# Patient Record
Sex: MALE | Race: ASIAN
[De-identification: names, ages, dates, MRNs, and addresses within clinical notes are randomized per-mention and may not be internally consistent; named-entity substitution may affect disease eponyms.]

---

## 2019-12-04 ENCOUNTER — HOSPITAL ENCOUNTER (EMERGENCY)
Dept: HOSPITAL 27 - EMS | Age: 39
LOS: 1 days | Discharge: HOME | End: 2019-12-05
Payer: COMMERCIAL

## 2019-12-04 VITALS — HEIGHT: 66 IN | BODY MASS INDEX: 22.82 KG/M2 | WEIGHT: 141.98 LBS

## 2019-12-04 DIAGNOSIS — G40.909: Primary | ICD-10-CM

## 2019-12-04 LAB
ALBUMIN SERPL-MCNC: 4.1 G/DL (ref 3.4–5)
ALP SERPL-CCNC: 148 U/L (ref 46–116)
ALT SERPL-CCNC: 84 U/L (ref 12–78)
ANION GAP SERPL CALCULATED.3IONS-SCNC: 8 MMOL/L (ref 8–16)
AST SERPL-CCNC: 37 U/L (ref 15–37)
BASOPHILS # BLD AUTO: 0.1 K/UL (ref 0–0.2)
BASOPHILS NFR BLD AUTO: 0.6 % (ref 0–2)
BILIRUB SERPL-MCNC: 0.4 MG/DL (ref 0.1–1)
BUN SERPL-MCNC: 16 MG/DL (ref 7–18)
CALCIUM SERPL-MCNC: 9.1 MG/DL (ref 8.8–10.5)
CHLORIDE SERPL-SCNC: 99 MMOL/L (ref 98–107)
CO2 SERPL-SCNC: 29 MMOL/L (ref 22–29)
CREAT SERPL-MCNC: 1.32 MG/DL (ref 0.6–1.3)
EOSINOPHIL # BLD AUTO: 0.1 K/UL (ref 0–0.7)
EOSINOPHIL NFR BLD AUTO: 1 % (ref 1–6)
ERYTHROCYTE [DISTWIDTH] IN BLOOD BY AUTOMATED COUNT: 12 % (ref 11.5–14.5)
GFR SERPL CREATININE-BSD FRML MDRD: > 60 ML/MIN (ref 60–?)
GLUCOSE SERPL-MCNC: 146 MG/DL (ref 70–110)
HCT VFR BLD AUTO: 43.2 % (ref 41–53)
HGB BLD-MCNC: 14.7 G/DL (ref 13.5–17.5)
LYMPHOCYTES # BLD AUTO: 4.1 K/UL (ref 1–4.8)
LYMPHOCYTES NFR BLD AUTO: 38.1 % (ref 22–44)
MCH RBC QN AUTO: 29.7 PG (ref 26–34)
MCHC RBC AUTO-ENTMCNC: 34.1 G/DL (ref 31–37)
MCV RBC AUTO: 87 FL (ref 80–100)
MONOCYTES # BLD AUTO: 0.9 K/UL (ref 0.1–1)
MONOCYTES NFR BLD AUTO: 7.9 % (ref 2–9)
NEUTROPHILS # BLD AUTO: 5.6 K/UL (ref 1.8–7.7)
NEUTROPHILS NFR BLD AUTO: 52.4 % (ref 40–70)
PHENYTOIN SERPL-MCNC: < 0.5 MCG/ML (ref 10–20)
PLATELET # BLD AUTO: 308 K/UL (ref 150–450)
POTASSIUM SERPL-SCNC: 3.3 MMOL/L (ref 3.5–5.1)
PROT SERPL-MCNC: 8.4 G/DL (ref 6.4–8.2)
RBC # BLD AUTO: 4.96 MIL/UL (ref 4.5–5.9)
SODIUM SERPL-SCNC: 136 MMOL/L (ref 136–145)
VALPROATE SERPL-MCNC: < 3 MCG/ML (ref 50–100)

## 2019-12-04 PROCEDURE — 80307 DRUG TEST PRSMV CHEM ANLYZR: CPT

## 2019-12-04 PROCEDURE — 80185 ASSAY OF PHENYTOIN TOTAL: CPT

## 2019-12-04 PROCEDURE — 85730 THROMBOPLASTIN TIME PARTIAL: CPT

## 2019-12-04 PROCEDURE — 36415 COLL VENOUS BLD VENIPUNCTURE: CPT

## 2019-12-04 PROCEDURE — 85025 COMPLETE CBC W/AUTO DIFF WBC: CPT

## 2019-12-04 PROCEDURE — 81003 URINALYSIS AUTO W/O SCOPE: CPT

## 2019-12-04 PROCEDURE — 80164 ASSAY DIPROPYLACETIC ACD TOT: CPT

## 2019-12-04 PROCEDURE — 99285 EMERGENCY DEPT VISIT HI MDM: CPT

## 2019-12-04 PROCEDURE — 70450 CT HEAD/BRAIN W/O DYE: CPT

## 2019-12-04 PROCEDURE — 96365 THER/PROPH/DIAG IV INF INIT: CPT

## 2019-12-04 PROCEDURE — 80053 COMPREHEN METABOLIC PANEL: CPT

## 2019-12-05 VITALS — DIASTOLIC BLOOD PRESSURE: 81 MMHG | SYSTOLIC BLOOD PRESSURE: 143 MMHG

## 2019-12-05 LAB
AMPHETAMINES UR QL SCN: NEGATIVE
APPEARANCE UR: CLEAR
BARBITURATES UR QL SCN: NEGATIVE
BENZODIAZ UR QL SCN: NEGATIVE
BILIRUB UR QL STRIP: NEGATIVE
BZE UR QL SCN: NEGATIVE
CANNABINOIDS UR QL SCN: NEGATIVE
GLUCOSE UR STRIP-MCNC: NEGATIVE MG/DL
KETONES UR STRIP-MCNC: NEGATIVE MG/DL
LEUKOCYTE ESTERASE UR QL STRIP: NEGATIVE
METHADONE UR QL SCN: NEGATIVE
NITRITE UR QL STRIP: NEGATIVE
OPIATES UR QL SCN: NEGATIVE
PCP UR QL SCN: NEGATIVE
PH UR STRIP: 6.5 [PH] (ref 5–8)
PROT UR QL STRIP: NEGATIVE
RBC UR QL: NEGATIVE
SP GR UR STRIP: 1.01 (ref 1–1.02)
UROBILINOGEN UR-MCNC: 0.2 MG/DL (ref ?–1)

## 2020-11-03 ENCOUNTER — OFFICE VISIT (OUTPATIENT)
Dept: FAMILY MEDICINE | Facility: CLINIC | Age: 40
End: 2020-11-03
Payer: COMMERCIAL

## 2020-11-03 VITALS
SYSTOLIC BLOOD PRESSURE: 149 MMHG | WEIGHT: 143.6 LBS | DIASTOLIC BLOOD PRESSURE: 93 MMHG | HEART RATE: 70 BPM | BODY MASS INDEX: 26.43 KG/M2 | OXYGEN SATURATION: 100 % | RESPIRATION RATE: 18 BRPM | HEIGHT: 62 IN

## 2020-11-03 DIAGNOSIS — G40.909 SEIZURE DISORDER (H): ICD-10-CM

## 2020-11-03 DIAGNOSIS — R03.0 ELEVATED BLOOD PRESSURE READING WITHOUT DIAGNOSIS OF HYPERTENSION: ICD-10-CM

## 2020-11-03 DIAGNOSIS — Z00.00 ROUTINE GENERAL MEDICAL EXAMINATION AT A HEALTH CARE FACILITY: ICD-10-CM

## 2020-11-03 DIAGNOSIS — Z23 NEED FOR PROPHYLACTIC VACCINATION AND INOCULATION AGAINST INFLUENZA: Primary | ICD-10-CM

## 2020-11-03 PROCEDURE — 99385 PREV VISIT NEW AGE 18-39: CPT | Mod: 25 | Performed by: STUDENT IN AN ORGANIZED HEALTH CARE EDUCATION/TRAINING PROGRAM

## 2020-11-03 PROCEDURE — 90686 IIV4 VACC NO PRSV 0.5 ML IM: CPT | Performed by: STUDENT IN AN ORGANIZED HEALTH CARE EDUCATION/TRAINING PROGRAM

## 2020-11-03 PROCEDURE — 90471 IMMUNIZATION ADMIN: CPT | Performed by: STUDENT IN AN ORGANIZED HEALTH CARE EDUCATION/TRAINING PROGRAM

## 2020-11-03 RX ORDER — LEVETIRACETAM 750 MG/1
750 TABLET ORAL 2 TIMES DAILY
COMMUNITY
End: 2022-11-08

## 2020-11-03 SDOH — HEALTH STABILITY: MENTAL HEALTH: HOW OFTEN DO YOU HAVE 6 OR MORE DRINKS ON ONE OCCASION?: NOT ASKED

## 2020-11-03 SDOH — HEALTH STABILITY: MENTAL HEALTH: HOW MANY STANDARD DRINKS CONTAINING ALCOHOL DO YOU HAVE ON A TYPICAL DAY?: NOT ASKED

## 2020-11-03 SDOH — HEALTH STABILITY: MENTAL HEALTH: HOW OFTEN DO YOU HAVE A DRINK CONTAINING ALCOHOL?: NOT ASKED

## 2020-11-03 ASSESSMENT — MIFFLIN-ST. JEOR: SCORE: 1443.24

## 2020-11-03 NOTE — PROGRESS NOTES
Male Physical Note    Concerns today: needs request for medical opinion form completed for [facility]    ALEK Draper  was used for  this visit.     ROS:                      CONSTITUTIONAL: no fatigue, no unexpected change in weight  SKIN: no worrisome rashes, no worrisome moles, no worrisome lesions  EYES: no acute vision problems or changes  ENT: no ear problems, no mouth problems, no throat problems  RESP: no significant cough, no shortness of breath  CV: no chest pain, no palpitations, no new or worsening peripheral edema  GI: no nausea, no vomiting, no constipation, no diarrhea    No past medical history on file. Denies any past medical history.     Family History   Problem Relation Age of Onset     No Known Problems Mother      No Known Problems Father      No Known Problems Maternal Grandmother      No Known Problems Maternal Grandfather      No Known Problems Paternal Grandmother      No Known Problems Paternal Grandfather      No Known Problems Brother      No Known Problems Sister      No Known Problems Son      No Known Problems Daughter      No Known Problems Maternal Half-Brother      No Known Problems Maternal Half-Sister      No Known Problems Paternal Half-Brother      No Known Problems Paternal Half-Sister      No Known Problems Niece      No Known Problems Nephew      No Known Problems Cousin      No Known Problems Other      Cancer No family hx of      Diabetes No family hx of      Coronary Artery Disease No family hx of      Heart Disease No family hx of      Hypertension No family hx of      Hyperlipidemia No family hx of      Kidney Disease No family hx of      Cerebrovascular Disease No family hx of      Obesity No family hx of      Thrombosis No family hx of      Asthma No family hx of      Arthritis No family hx of      Thyroid Disease No family hx of      Depression No family hx of      Mental Illness No family hx of      Substance Abuse No family hx of      Cystic Fibrosis No  "family hx of      Early Death No family hx of      Coronary Artery Disease Early Onset No family hx of      Heart Failure No family hx of      Bleeding Diathesis No family hx of      Dementia No family hx of      Breast Cancer No family hx of      Ovarian Cancer No family hx of      Uterine Cancer No family hx of      Prostate Cancer No family hx of      Colorectal Cancer No family hx of      Pancreatic Cancer No family hx of      Lung Cancer No family hx of      Melanoma No family hx of      Autoimmune Disease No family hx of      Unknown/Adopted No family hx of      Genetic Disorder No family hx of     Reviewed no other significant FH           Family History and past Medical History reviewed and unchanged/updated.    Social History     Tobacco Use     Smoking status: Light Tobacco Smoker     Types: Cigarettes     Smokeless tobacco: Current User   Substance Use Topics     Alcohol use: Not Currently       Children ? Yes, 4 children    Has anyone hurt you physically, for example by pushing, hitting, slapping or kicking you or forcing you to have sex? Denies  Do you feel threatened or controlled by a partner, ex-partner or anyone in your life? Denies    RISK BEHAVIORS AND HEALTHY HABITS:  Tobacco Use/Smokin-2 cigarettes per week  Illicit Drug Use: None  ETOH: None  Sexually Active: Yes  Diet (5-7 servings of fruits/veg daily): No   Exercise (30 min accumulated most days):No  Dental Care: No   Calcium 1500 mg/d:  Yes  Seat Belt Use: Yes     HIV screening:  Will attempt to obtain outside records      There is no immunization history on file for this patient. Will attempt to obtain outside records. Patient cannot recall name of previous clinic in [place]. He will attempt to obtain this for his follow-up visit in 1 month.    EXAMINATION:  BP (!) 149/93 (BP Location: Left arm, Patient Position: Left side, Cuff Size: Adult Regular)   Pulse 70   Resp 18   Ht 1.571 m (5' 1.85\")   Wt 65.1 kg (143 lb 9.6 oz)  "  SpO2 100%   BMI 26.39 kg/m    GENERAL: healthy, alert and no distress  EYES: Eyes grossly normal to inspection, extraocular movements - intact, and PERRL  HENT: ear canals- normal; TMs- normal; Nose- normal; Mouth- no ulcers, no lesions  NECK: no tenderness, no adenopathy, no asymmetry, no masses, no stiffness; thyroid- normal to palpation  RESP: lungs clear to auscultation - no rales, no rhonchi, no wheezes  CV: regular rates and rhythm, normal S1 S2, no S3 or S4 and no murmur, no click or rub -  ABDOMEN: soft, no tenderness, no  hepatosplenomegaly, no masses, normal bowel sounds  MS: extremities- no gross deformities noted, no edema  SKIN: no suspicious lesions, no rashes  NEURO: strength and tone- normal, sensory exam- grossly normal, mentation- intact, speech- normal, reflexes- symmetric  BACK: no CVA tenderness, no paralumbar tenderness  PSYCH: Alert and oriented times 3; speech- coherent , normal rate and volume; able to articulate logical thoughts, able to abstract reason, no tangential thoughts, no hallucinations or delusions, affect- normal  LYMPHATICS: ant. cervical- normal, post. cervical- normal, axillary- normal, supraclavicular- normal, inguinal- normal    ASSESSMENT/PLAN:  Ler was seen today for physical and imm/inj.    Diagnoses and all orders for this visit:    Need for prophylactic vaccination and inoculation against influenza  -     INFLUENZA VACCINE IM > 6 MONTHS VALENT IIV4 [31528]    Routine general medical examination at a health care facility    Seizure disorder (H)  Completed medical opinion form and will have it scanned into chart. No driving or operating heavy equipment.    Elevated blood pressure reading without diagnosis of hypertension  Discussed with patient and recommended repeat BP check within 1 month.    I precepted with Dr. Rice.  Yvrose Pradhan MD PGY3

## 2020-11-03 NOTE — NURSING NOTE
Due to patient being non-English speaking/uses sign language, an  was used for this visit. Only for face-to-face interpretation by an external agency, date and length of interpretation can be found on the scanned worksheet.     name: Kristie Justice   Language: Bonny  Agency: [employer]  Phone number: [phone]      KELLEY Matthews  8:13 AM  11/3/2020

## 2020-11-03 NOTE — PROGRESS NOTES
Preceptor Attestation:    Patient seen and evaluated in person. I discussed the patient with the resident. I have verified the content of the note, which accurately reflects my assessment of the patient and the plan of care.   Supervising Physician:  Wilson Rice MD.

## 2020-12-07 ENCOUNTER — OFFICE VISIT (OUTPATIENT)
Dept: FAMILY MEDICINE | Facility: CLINIC | Age: 40
End: 2020-12-07
Payer: COMMERCIAL

## 2020-12-07 VITALS
WEIGHT: 145 LBS | DIASTOLIC BLOOD PRESSURE: 92 MMHG | RESPIRATION RATE: 24 BRPM | HEIGHT: 61 IN | TEMPERATURE: 98.3 F | SYSTOLIC BLOOD PRESSURE: 137 MMHG | OXYGEN SATURATION: 99 % | HEART RATE: 91 BPM | BODY MASS INDEX: 27.38 KG/M2

## 2020-12-07 DIAGNOSIS — I10 ESSENTIAL HYPERTENSION: Primary | ICD-10-CM

## 2020-12-07 LAB
% GRANULOCYTES: 62.8 %G (ref 40–75)
BUN SERPL-MCNC: 11.3 MG/DL (ref 7–21)
CALCIUM SERPL-MCNC: 9.9 MG/DL (ref 8.5–10.1)
CHLORIDE SERPLBLD-SCNC: 98.4 MMOL/L (ref 98–110)
CHOLEST SERPL-MCNC: 226.8 MG/DL (ref 0–200)
CHOLEST/HDLC SERPL: 8.4 {RATIO} (ref 0–5)
CO2 SERPL-SCNC: 31.4 MMOL/L (ref 20–32)
CREAT SERPL-MCNC: 1.1 MG/DL (ref 0.7–1.3)
GFR SERPL CREATININE-BSD FRML MDRD: 76.8 ML/MIN/1.7 M2
GLUCOSE SERPL-MCNC: 114.8 MG'DL (ref 70–99)
GRANULOCYTES #: 5 K/UL (ref 1.6–8.3)
HBA1C MFR BLD: 4.8 % (ref 4.1–5.7)
HCT VFR BLD AUTO: 47.9 % (ref 40–53)
HDLC SERPL-MCNC: 26.9 MG/DL
HEMOGLOBIN: 15 G/DL (ref 13.3–17.7)
LDLC SERPL CALC-MCNC: ABNORMAL MG/DL (ref 0–129)
LDLC SERPL DIRECT ASSAY-MCNC: 80 MG/DL
LYMPHOCYTES # BLD AUTO: 2.3 K/UL (ref 0.8–5.3)
LYMPHOCYTES NFR BLD AUTO: 29.4 %L (ref 20–48)
MCH RBC QN AUTO: 29.1 PG (ref 26.5–35)
MCHC RBC AUTO-ENTMCNC: 31.3 G/DL (ref 32–36)
MCV RBC AUTO: 93.1 FL (ref 78–100)
MID #: 0.6 K/UL (ref 0–2.2)
MID %: 7.8 %M (ref 0–20)
PLATELET # BLD AUTO: 335 K/UL (ref 150–450)
POTASSIUM SERPL-SCNC: 4.7 MMOL/L (ref 3.2–4.6)
RBC # BLD AUTO: 5.2 M/UL (ref 4.4–5.9)
SODIUM SERPL-SCNC: 134.9 MMOL/L (ref 132–142)
TRIGL SERPL-MCNC: >941 MG/DL (ref 0–150)
TSH SERPL DL<=0.05 MIU/L-ACNC: 1.65 UIU/ML (ref 0.3–5)
VLDL CHOLESTEROL: 236.1 MG/DL (ref 7–32)
WBC # BLD AUTO: 7.9 K/UL (ref 4–11)

## 2020-12-07 PROCEDURE — 85025 COMPLETE CBC W/AUTO DIFF WBC: CPT | Performed by: STUDENT IN AN ORGANIZED HEALTH CARE EDUCATION/TRAINING PROGRAM

## 2020-12-07 PROCEDURE — 83036 HEMOGLOBIN GLYCOSYLATED A1C: CPT | Performed by: STUDENT IN AN ORGANIZED HEALTH CARE EDUCATION/TRAINING PROGRAM

## 2020-12-07 PROCEDURE — 99214 OFFICE O/P EST MOD 30 MIN: CPT | Mod: GC | Performed by: STUDENT IN AN ORGANIZED HEALTH CARE EDUCATION/TRAINING PROGRAM

## 2020-12-07 PROCEDURE — 36415 COLL VENOUS BLD VENIPUNCTURE: CPT | Performed by: STUDENT IN AN ORGANIZED HEALTH CARE EDUCATION/TRAINING PROGRAM

## 2020-12-07 PROCEDURE — 80048 BASIC METABOLIC PNL TOTAL CA: CPT | Performed by: STUDENT IN AN ORGANIZED HEALTH CARE EDUCATION/TRAINING PROGRAM

## 2020-12-07 PROCEDURE — 80061 LIPID PANEL: CPT | Performed by: STUDENT IN AN ORGANIZED HEALTH CARE EDUCATION/TRAINING PROGRAM

## 2020-12-07 RX ORDER — AMLODIPINE BESYLATE 5 MG/1
5 TABLET ORAL DAILY
Qty: 90 TABLET | Refills: 0 | Status: SHIPPED | OUTPATIENT
Start: 2020-12-07 | End: 2021-01-05

## 2020-12-07 ASSESSMENT — MIFFLIN-ST. JEOR: SCORE: 1428.16

## 2020-12-07 NOTE — PROGRESS NOTES
"[facility]         SUBJECTIVE   Clara Escobar is a 40 year old male with a PMH of:  Patient Active Problem List   Diagnosis     Seizure disorder (H)     presenting to clinic today to follow-up regarding an elevated blood pressure without history of hypertension.  He was seen for a physical on 11/3/2020.  His blood pressure at that visit was noted to be 147/96 and 149/93 on repeat.  He recently moved to [place] from [place], and outside records are not available.  He denies any known history of hypertension and states that he has never been on any medications for his blood pressure.  He does note occasional headaches approximately once per week.  He denies any vision changes.  He states that he is currently not active, as he mostly stays home due to the COVID-19 pandemic.  He eats a traditional  diet including rice and meat.    PMH, Medications and Allergies were reviewed and updated as needed.    ROS:  General: No fevers, chills  Head: Intermittent headaches approximately once per week  Ears: No acute change in hearing.    CV: No chest pain or palpitations.  Resp: No shortness of breath.  No cough. No hemoptysis.  GI: No nausea, vomiting, constipation, diarrhea  : No urinary pains    Current Outpatient Medications   Medication Sig Dispense Refill     levETIRAcetam (KEPPRA) 750 MG tablet Take 750 mg by mouth 2 times daily            OBJECTIVE:   Vitals:   Vitals:    12/07/20 1359 12/07/20 1402   BP: (!) 149/99 (!) 137/92   BP Location: Right arm Right arm   Patient Position: Sitting Sitting   Cuff Size: Adult Regular Adult Regular   Pulse: 91    Resp: 24    Temp: 98.3  F (36.8  C)    TempSrc: Oral    SpO2: 99%    Weight: 65.8 kg (145 lb)    Height: 1.537 m (5' 0.5\")      BMI: Body mass index is 27.85 kg/m .    Gen:  Well nourished and in no acute distress  HEENT: Extraocular movement intact.  Neck: supple without lymphadenopathy  CV:  RRR  - no murmurs noted   Pulm:  CTAB, no wheezes or " crackles noted, good air entry   ABD: Soft, nontender, no masses, no rebound, BS intact throughout, no hepatosplenomegaly  Extrem: No cyanosis, edema or clubbing  Psych: Euthymic           ASSESSMENT and PLAN:   Clara was seen today for follow up.    Diagnoses and all orders for this visit:    Essential hypertension  -     Basic Metabolic Panel ([facility])  - Results < 1 hr  -     Urinalysis ([facility])  -     CBC with Diff Plt ([facility])  -     Lipid Panel ([facility]) - Results < 1 hr  -     TSH  Sensitive ([facility])  -     Hemoglobin A1c ([facility])  -     Blood Pressure Monitoring (BLOOD PRESSURE MONITOR/M CUFF) MISC; 1 each daily  -     amLODIPine (NORVASC) 5 MG tablet; Take 1 tablet (5 mg) by mouth daily    Since this is a new diagnosis of hypertension, will obtain lab work for further evaluation.  He does not feel like he could make significant lifestyle changes at this time and is interested in starting a medication.  Will start amlodipine 5 mg daily.  Also prescribed a blood pressure monitor.  We will have him return in 1 month for a blood pressure check.  Also asked him to bring his blood pressure monitor with him in case he has any questions on how to use it.    Return to clinic in 1 month for follow up of hypertension. Return sooner if develops new or worsening symptoms.    Patient Instructions   -Go to lab before leaving today.  -Start taking amlodipine 1 tablet daily.  -Also pickup blood pressure cuff from the pharmacy.  -Follow-up in 1 month to help make sure the medication is working.  -If you have any lightheadedness of issues with the medication, please call the clinic.    Options for treatment and/or follow-up care were reviewed with the patient was actively involved in the decision making process. Patient verbalized understanding and was in agreement with the plan.    The patient was seen by and discussed with Benjamin Davis MD.  Yvrose Pradhan MD PGY3

## 2020-12-07 NOTE — PATIENT INSTRUCTIONS
-Go to lab before leaving today.  -Start taking amlodipine 1 tablet daily.  -Also pickup blood pressure cuff from the pharmacy.  -Follow-up in 1 month to help make sure the medication is working.  -If you have any lightheadedness of issues with the medication, please call the clinic.

## 2020-12-07 NOTE — NURSING NOTE
Due to patient being non-English speaking/uses sign language, an  was used for this visit. Only for face-to-face interpretation by an external agency, date and length of interpretation can be found on the scanned worksheet.     name: Kristie Justice  Agency: [employer]  Language: Bonny   Telephone number: [phone]  Type of interpretation: Telemedicine, spoken

## 2020-12-07 NOTE — PROGRESS NOTES
Preceptor Attestation:    Patient seen and evaluated in person. I discussed the patient with the resident. I have verified the content of the note, which accurately reflects my assessment of the patient and the plan of care.   Supervising Physician:  Benjamin Davis MD.

## 2021-01-05 ENCOUNTER — OFFICE VISIT (OUTPATIENT)
Dept: FAMILY MEDICINE | Facility: CLINIC | Age: 41
End: 2021-01-05
Payer: COMMERCIAL

## 2021-01-05 VITALS
HEIGHT: 62 IN | WEIGHT: 143 LBS | RESPIRATION RATE: 20 BRPM | OXYGEN SATURATION: 100 % | DIASTOLIC BLOOD PRESSURE: 94 MMHG | TEMPERATURE: 97.3 F | HEART RATE: 80 BPM | BODY MASS INDEX: 26.31 KG/M2 | SYSTOLIC BLOOD PRESSURE: 137 MMHG

## 2021-01-05 DIAGNOSIS — E78.1 HYPERTRIGLYCERIDEMIA: ICD-10-CM

## 2021-01-05 DIAGNOSIS — I10 ESSENTIAL HYPERTENSION: Primary | ICD-10-CM

## 2021-01-05 LAB
BILIRUBIN UR: NEGATIVE MG/DL
BLOOD UR: NEGATIVE MG/DL
GLUCOSE URINE: NEGATIVE
KETONES UR QL: NEGATIVE MG/DL
LEUKOCYTE ESTERASE UR: NEGATIVE
NITRITE UR QL STRIP: NEGATIVE MG/DL
PH UR STRIP: 7 [PH] (ref 4.5–8)
PROTEIN UR: NEGATIVE MG/DL
SP GR UR STRIP: 1.02 (ref 1–1.03)
UROBILINOGEN UR STRIP-ACNC: NORMAL E.U./DL

## 2021-01-05 PROCEDURE — 81003 URINALYSIS AUTO W/O SCOPE: CPT | Performed by: STUDENT IN AN ORGANIZED HEALTH CARE EDUCATION/TRAINING PROGRAM

## 2021-01-05 PROCEDURE — 99214 OFFICE O/P EST MOD 30 MIN: CPT | Mod: GC | Performed by: STUDENT IN AN ORGANIZED HEALTH CARE EDUCATION/TRAINING PROGRAM

## 2021-01-05 RX ORDER — AMLODIPINE BESYLATE 10 MG/1
10 TABLET ORAL DAILY
Qty: 90 TABLET | Refills: 1 | Status: SHIPPED | OUTPATIENT
Start: 2021-01-05 | End: 2022-11-08

## 2021-01-05 RX ORDER — FENOFIBRATE 48 MG/1
48 TABLET, COATED ORAL DAILY
Qty: 90 TABLET | Refills: 0 | Status: SHIPPED | OUTPATIENT
Start: 2021-01-05 | End: 2022-11-08

## 2021-01-05 ASSESSMENT — MIFFLIN-ST. JEOR: SCORE: 1446.14

## 2021-01-05 NOTE — NURSING NOTE
Due to patient being non-English speaking/uses sign language, an  was used for this visit. Only for face-to-face interpretation by an external agency, date and length of interpretation can be found on the scanned worksheet.     name: Kristie Justice  Agency: [employer]  Language: Bonny   Telephone number: [phone]  Type of interpretation: Telephone, spoken

## 2021-01-05 NOTE — PATIENT INSTRUCTIONS
-Get your fasting lipid panel drawn at lab. You can schedule a lab only visit for this.  -Increase amlodipine to 10 mg daily.  -Start fenofibrate 48 mg daily.

## 2021-01-05 NOTE — PROGRESS NOTES
"Assessment & Plan     Essential hypertension  - Urinalysis ([facility])  - amLODIPine (NORVASC) 10 MG tablet  Dispense: 90 tablet; Refill: 1  - Blood Pressure Monitoring (BLOOD PRESSURE MONITOR/M CUFF) MISC  Dispense: 1 each; Refill: 0    Hypertriglyceridemia  - Lipid Panel ([facility]) - Results < 1 hr  - fenofibrate (TRICOR) 48 MG tablet  Dispense: 90 tablet; Refill: 0    Review of the result(s) of each unique test - lipid panel, BMP, CBC, TSH, HgbA1c. Discussed results with patient, possible complications of hyper triglyceridemia including pancreatitis, and discussed management options including dietary modification and medications.            40 minutes spent on the date of the encounter doing chart review, history and exam, documentation and further activities as noted above         BMI:   Estimated body mass index is 25.98 kg/m  as calculated from the following:    Height as of this encounter: 1.58 m (5' 2.21\").    Weight as of this encounter: 64.9 kg (143 lb).   Weight management plan: Discussed healthy diet and exercise guidelines    FUTURE LABS:       - Schedule a fasting blood draw in 2 days  FUTURE APPOINTMENTS:       - Follow-up visit in 6 weeks    Patient Instructions   -Get your fasting lipid panel drawn at lab. You can schedule a lab only visit for this.  -Increase amlodipine to 10 mg daily.  -Start fenofibrate 48 mg daily.    Return in about 6 weeks (around 2/16/2021).    Yvrose Pradhan MD PGY3  [facility]    Ila Escobar is a 40 year old male who presents to clinic today for the following health issues:    HPI   Clara Escobar is a 40 year old male with a history of   Patient Active Problem List   Diagnosis     Seizure disorder (H)     Essential hypertension     Following up regarding a new diagnosis of hypertension. Has been taking amlodipine 5 daily without any side effects.  She was not able to  the blood pressure cuff at the pharmacy.    No personal or family history " "of pancreatitis or hypertriglyceridemia as far as he is aware.    He did bring his immunization record with him today.    Review of Systems   Constitutional, HEENT, cardiovascular, pulmonary, gi and gu systems are negative, except as otherwise noted.      Objective    BP (!) 137/94   Pulse 80   Temp 97.3  F (36.3  C) (Oral)   Resp 20   Ht 1.58 m (5' 2.21\")   Wt 64.9 kg (143 lb)   SpO2 100%   BMI 25.98 kg/m    Body mass index is 25.98 kg/m .     Physical Exam   GENERAL: healthy, alert and no distress  NECK: no adenopathy, no asymmetry, masses, or scars and thyroid normal to palpation  RESP: lungs clear to auscultation - no rales, rhonchi or wheezes  CV: regular rate and rhythm, normal S1 S2, no S3 or S4, no murmur, click or rub, no peripheral edema and peripheral pulses strong  ABDOMEN: soft, nontender, no hepatosplenomegaly, no masses and bowel sounds normal  MS: no gross musculoskeletal defects noted, no edema    Office Visit on 12/07/2020   Component Date Value Ref Range Status     Urea Nitrogen 12/07/2020 11.3  7.0 - 21.0 mg/dL Final     Calcium 12/07/2020 9.9  8.5 - 10.1 mg/dL Final     Chloride 12/07/2020 98.4  98.0 - 110.0 mmol/L Final     Carbon Dioxide 12/07/2020 31.4  20.0 - 32.0 mmol/L Final     Creatinine 12/07/2020 1.1  0.7 - 1.3 mg/dL Final     Glucose 12/07/2020 114.8* 70.0 - 99.0 mg'dL Final     Potassium 12/07/2020 4.7* 3.2 - 4.6 mmol/L Final     Sodium 12/07/2020 134.9  132.0 - 142.0 mmol/L Final     GFR Estimate 12/07/2020 76.8  >60.0 mL/min/1.7 m2 Final     GFR Estimate If Black 12/07/2020 >90  >60.0 mL/min/1.7 m2 Final     WBC 12/07/2020 7.9  4.0 - 11.0 K/uL Final     Lymphocytes # 12/07/2020 2.3  0.8 - 5.3 K/uL Final     % Lymphocytes 12/07/2020 29.4  20.0 - 48.0 %L Final     Mid # 12/07/2020 0.6  0.0 - 2.2 K/uL Final     Mid % 12/07/2020 7.8  0.0 - 20.0 %M Final     GRANULOCYTES # 12/07/2020 5.0  1.6 - 8.3 K/uL Final     % Granulocytes 12/07/2020 62.8  40.0 - 75.0 %G Final     RBC " 12/07/2020 5.2  4.4 - 5.9 M/uL Final     Hemoglobin 12/07/2020 15.0  13.3 - 17.7 g/dL Final     Hematocrit 12/07/2020 47.9  40.0 - 53.0 % Final     MCV 12/07/2020 93.1  78.0 - 100.0 fL Final     MCH 12/07/2020 29.1  26.5 - 35.0 Final     MCHC 12/07/2020 31.3* 32.0 - 36.0 g/dL Final     Platelets 12/07/2020 335.0  150.0 - 450.0 K/uL Final     Cholesterol 12/07/2020 226.8* 0.0 - 200.0 mg/dL Final     Cholesterol/HDL Ratio 12/07/2020 8.4* 0.0 - 5.0 Final     HDL Cholesterol 12/07/2020 26.9* >40.0 mg/dL Final     LDL Cholesterol Calculated 12/07/2020 Unable to calculate Trig >=400  0 - 129 mg/dL Final     Triglycerides 12/07/2020 >941* 0.0 - 150.0 mg/dL Final     VLDL Cholesterol 12/07/2020 236.1* 7.0 - 32.0 mg/dL Final     TSH 12/07/2020 1.65  0.30 - 5.00 uIU/mL Final     Hemoglobin A1C 12/07/2020 4.8  4.1 - 5.7 % Final     LDL Cholesterol Direct 12/07/2020 80  <=129 mg/dl Final     Yvrose Pradhan MD PGY3

## 2021-02-17 ENCOUNTER — OFFICE VISIT (OUTPATIENT)
Dept: FAMILY MEDICINE | Facility: CLINIC | Age: 41
End: 2021-02-17
Payer: COMMERCIAL

## 2021-02-17 VITALS
SYSTOLIC BLOOD PRESSURE: 129 MMHG | HEART RATE: 72 BPM | RESPIRATION RATE: 16 BRPM | OXYGEN SATURATION: 100 % | DIASTOLIC BLOOD PRESSURE: 89 MMHG | TEMPERATURE: 98.1 F

## 2021-02-17 DIAGNOSIS — E78.1 HYPERTRIGLYCERIDEMIA: Primary | ICD-10-CM

## 2021-02-17 DIAGNOSIS — I10 ESSENTIAL HYPERTENSION: ICD-10-CM

## 2021-02-17 LAB
CHOLEST SERPL-MCNC: 206.6 MG/DL (ref 0–200)
CHOLEST/HDLC SERPL: 6.3 {RATIO} (ref 0–5)
HDLC SERPL-MCNC: 32.8 MG/DL
LDLC SERPL CALC-MCNC: 133 MG/DL (ref 0–129)
TRIGL SERPL-MCNC: 203.3 MG/DL (ref 0–150)
VLDL CHOLESTEROL: 40.7 MG/DL (ref 7–32)

## 2021-02-17 PROCEDURE — 80061 LIPID PANEL: CPT | Performed by: STUDENT IN AN ORGANIZED HEALTH CARE EDUCATION/TRAINING PROGRAM

## 2021-02-17 PROCEDURE — 36415 COLL VENOUS BLD VENIPUNCTURE: CPT | Performed by: STUDENT IN AN ORGANIZED HEALTH CARE EDUCATION/TRAINING PROGRAM

## 2021-02-17 PROCEDURE — 99214 OFFICE O/P EST MOD 30 MIN: CPT | Mod: GC | Performed by: STUDENT IN AN ORGANIZED HEALTH CARE EDUCATION/TRAINING PROGRAM

## 2021-02-17 NOTE — NURSING NOTE
Due to patient being non-English speaking/uses sign language, an  was used for this visit. Only for face-to-face interpretation by an external agency, date and length of interpretation can be found on the scanned worksheet.     name: Kristie Justice  Agency: [employer]  Language: Bonny   Telephone number: [phone]  Type of interpretation: Telephone, spoken

## 2021-02-17 NOTE — PROGRESS NOTES
Assessment & Plan   Problem List Items Addressed This Visit        Circulatory    Essential hypertension      Other Visit Diagnoses     Hypertriglyceridemia    -  Primary    Relevant Orders    Lipid Panel ([facility]) - Results < 1 hr      Will obtain fasting lipid panel today to determine baseline level of triglycerides.  Asked that he start taking the fenofibrate daily, and he is in agreement with this.  Recommended that he return in 6 weeks for a repeat fasting lipid panel to evaluate the effectiveness of the fenofibrate and whether we will need to increase the dose.  Also recommended that he bring his blood pressure cuff to that appointment so that nursing staff can show him how to use it.    Review of the result(s) of each unique test - lipid panel from 12/7/2020, UA from 1/5/2021     Return in about 6 weeks (around 3/31/2021) for in person, Follow up, with me, with fasting lipids.    Yvrose Pradhan MD PGY3  [facility] Elizabeth is a 40 year old who presents for the following health issues:    BALJIT Escobar is a 40-year-old male with a history of seizure disorder and recent diagnoses of hypertension and hyper triglyceridemia.  He has been taking amlodipine 10 mg daily without any reported side effects.  He did  the blood pressure cuff from the pharmacy but does not know how to use it and did not bring it with him today.  He missed his appointment for a fasting lipid test, but he has been fasting today.  He also has not started the fenofibrate.    Review of Systems   Constitutional, HEENT, cardiovascular, pulmonary, gi and gu systems are negative, except as otherwise noted.      Objective    /89   Pulse 72   Temp 98.1  F (36.7  C) (Oral)   Resp 16   SpO2 100%   There is no height or weight on file to calculate BMI.  Physical Exam   GENERAL: healthy, alert and no distress  NECK: no adenopathy, no asymmetry, masses, or scars and thyroid normal to palpation  RESP:  lungs clear to auscultation - no rales, rhonchi or wheezes  CV: regular rate and rhythm, normal S1 S2, no S3 or S4, no murmur, click or rub, no peripheral edema and peripheral pulses strong  ABDOMEN: soft, nontender, no hepatosplenomegaly, no masses and bowel sounds normal  MS: no gross musculoskeletal defects noted, no edema

## 2021-03-23 ENCOUNTER — OFFICE VISIT (OUTPATIENT)
Dept: FAMILY MEDICINE | Facility: CLINIC | Age: 41
End: 2021-03-23
Payer: COMMERCIAL

## 2021-03-23 VITALS
WEIGHT: 135.4 LBS | SYSTOLIC BLOOD PRESSURE: 138 MMHG | TEMPERATURE: 98.1 F | OXYGEN SATURATION: 99 % | HEART RATE: 83 BPM | DIASTOLIC BLOOD PRESSURE: 94 MMHG | BODY MASS INDEX: 24.6 KG/M2

## 2021-03-23 DIAGNOSIS — Z20.822 EXPOSURE TO COVID-19 VIRUS: Primary | ICD-10-CM

## 2021-03-23 PROCEDURE — 99213 OFFICE O/P EST LOW 20 MIN: CPT | Mod: CS | Performed by: STUDENT IN AN ORGANIZED HEALTH CARE EDUCATION/TRAINING PROGRAM

## 2021-03-23 RX ORDER — ACETAMINOPHEN 325 MG/1
325-650 TABLET ORAL EVERY 6 HOURS PRN
Qty: 30 TABLET | Refills: 0 | Status: SHIPPED | OUTPATIENT
Start: 2021-03-23 | End: 2022-11-08

## 2021-03-23 NOTE — LETTER
RETURN TO WORK/SCHOOL FORM    3/23/2021    Re: Clara Escobar  1980      To Whom It May Concern:    Clara Escobar was seen in clinic today. He was tested for COVID-19 virus due to symptoms. His return to work is dependent upon the test result. If positive, he must self quarantine at home for 10 days from the date of the positive test. If negative, he may return to work 24 hours after his symptoms are improved.    Sincerely,      Vince Conklin MD  3/23/2021 1:52 PM

## 2021-03-23 NOTE — PROGRESS NOTES
Assessment & Plan     Exposure to COVID-19 virus  Presents with 5-day history of productive cough and body aches. Possible COVID-19 exposure but unclear. Afebrile and vitally stable. No tachypnea, work of breathing, hypoxia or respiratory distress on exam. Differential includes viral URI, COVID-19 infection, pneumonia, influenza, seasonal allergies. Will test for COVID-19 today via PCR test. Encouraged supportive therapies including rest, hydration, acetaminophen. Discussed isolation precautions. Return to work will depend on COVID-19 test result.  - COVID-19 Virus PCR [facility] ([facility])  - acetaminophen (TYLENOL) 325 MG tablet  Dispense: 30 tablet; Refill: 0    25 minutes spent on the date of the encounter doing chart review, history and exam, documentation and further activities as noted above    Clinician location:  [facility]    Return to clinic if develops new, persistent, or worsening symptoms.    Patient was discussed with attending physician, Dr. Des Juarez MD, who agrees with the assessment and plan.    Vince Conklin, PGY-3  [facility]  03/23/21      SUBJECTIVE:  Clara sEcobar is a 40 year old male old who presents to clinic today with chief complaint of COVID-19 testing.    Has had 5 day history of cough and body aches. Cough is productive of yellow sputum. No fever, chills, shortness of breath, wheezing, or difficulty breathing. Concerned about COVID-19 infection and would like testing today.    Unsure if he has been exposed or not at work although thinks co-workers have been infected. No skin rash. No loss of taste or smell. No fatigue or weakness. No diarrhea. No runny nose, sore throat, or nasal congestion.    Review of Systems:  CONSTITUTIONAL: See above.  HEENT: See above.  SKIN: See above.}  RESPIRATORY: See above.  CARDIOVASCULAR: No chest pain.  GASTROINTESTINAL: No abdominal pain, nausea, vomiting, or diarrhea.  MUSCULOSKELETAL: See above.       OBJECTIVE:  BP (!) 138/94   Pulse 83   Temp 98.1  F (36.7  C) (Tympanic)   Wt 61.4 kg (135 lb 6.4 oz)   SpO2 99%   BMI 24.60 kg/m      Physical Exam:   GENERAL: Awake, alert. Well-nourished, well-developed. No acute distress. Appears comfortable.  HEENT: Head: Normocephalic and atraumatic; no dysmorphic features. Eyes: Eye lids and lashes normal; pupils equal, round and reactive to light; no scleral icterus or conjunctival injection. Nose: nares patent and without discharge; frontal and maxillary sinuses non-tender to palpation. Oropharynx: mucus membranes pink and moist; tonsils without enlargement, erythema or exudates.  NECK: Supple and symmetric. Trachea midline. No anterior or posterior cervical lymphadenopathy, no supraclavicular lymphadenopathy. Thyroid symmetric and without enlargement or tenderness. Skin normal.  LUNGS: No increased work of breathing; good air movement throughout all lung fields; breath sounds clear to auscultation bilaterally throughout all lung fields with no crackles or wheezing.  CARDIOVASCULAR: Regular rate and rhythm; normal S1 and S2; no S3 or S4; no murmur, rub or click. No JVD. Radial and dorsalis pedis/posterior tibial pulses intact; normal capillary refill. No peripheral edema.  ABDOMEN: Non-distended. Soft and non-tender in all quadrants; no masses or hepatosplenomegally.  PSYCH: Normal affect, mood, orientation, memory and insight.

## 2021-03-23 NOTE — PROGRESS NOTES
Preceptor Attestation:    I discussed the patient with the resident and evaluated the patient in person. I have verified the content of the note, which accurately reflects my assessment of the patient and the plan of care.   Supervising Physician:  Des Juarez MD.

## 2021-03-23 NOTE — NURSING NOTE
Due to patient being non-English speaking/uses sign language, an  was used for this visit. Only for face-to-face interpretation by an external agency, date and length of interpretation can be found on the scanned worksheet.     name: Roseann nicole  Agency: [employer]  Language: Bonny   Telephone number: [phone]  Type of interpretation: telephone, spoken         name: Roseann Nicole  Language: Bonny  Agency: [employer]  Phone number: [phone]

## 2021-03-24 LAB
LABORATORY COMMENT REPORT: ABNORMAL
SARS-COV-2 RNA RESP QL NAA+PROBE: NORMAL
SARS-COV-2 RNA RESP QL NAA+PROBE: POSITIVE
SPECIMEN SOURCE: ABNORMAL
SPECIMEN SOURCE: NORMAL

## 2021-03-25 ENCOUNTER — TELEPHONE (OUTPATIENT)
Dept: FAMILY MEDICINE | Facility: CLINIC | Age: 41
End: 2021-03-25

## 2021-03-25 NOTE — TELEPHONE ENCOUNTER
"Coronavirus (COVID-19) Notification    Caller Name (Patient, parent, daughter/son, grandparent, etc)  patient    Reason for call  Notify of Positive Coronavirus (COVID-19) lab results, assess symptoms,  review [facility] recommendations    Lab Result    Lab test:  2019-nCoV rRt-PCR or SARS-CoV-2 PCR    Oropharyngeal AND/OR nasopharyngeal swabs is POSITIVE for 2019-nCoV RNA/SARS-COV-2 PCR (COVID-19 virus)    RN Recommendations/Instructions per [facility] Coronavirus COVID-19 recommendations    All information relayed to the patient through a Bonny  ID # [id number].    Brief introduction script  Introduce self then review script:  \"I am calling on behalf of [facility].  We were notified that your Coronavirus test (COVID-19) for was POSITIVE for the virus.  I have some information to relay to you but first I wanted to mention that the [place] Dept of Health will be contacting you shortly [it's possible MD already called Patient] to talk to you more about how you are feeling and other people you have had contact with who might now also have the virus.  Also, [facility] is Partnering with the [facility] for Covid-19 research, you may be contacted directly by research staff.\"    Assessment (Inquire about Patient's current symptoms)   Assessment   Current Symptoms at time of phone call: (if no symptoms, document No symptoms] Cough, body aches, loss of smell and taste, low appetite, fatigue   Symptoms onset (if applicable) 3/18/2021     If at time of call, Patients symptoms hare worsened, the Patient should contact 911 or have someone drive them to Emergency Dept promptly:      If Patient calling 911, inform 911 personal that you have tested positive for the Coronavirus (COVID-19).  Place mask on and await 911 to arrive.    If Emergency Dept, If possible, please have another adult drive you to the Emergency Dept but you need to wear mask when in contact with other people.      Monoclonal " "Antibody Administration    You may be eligible to receive a new treatment with a monoclonal antibody for preventing hospitalization in patients at high risk for complications from COVID-19.   This medication is still experimental and available on a limited basis; it is given through an IV and must be given at an infusion center. Please note that not all people who are eligible will receive the medication since it is in limited supply.     Are you interested in being considered for this medication?  No.   Does the patient fit the criteria: Patient declined    If patient qualifies based on above criteria:  \"We will contact you if you are selected to receive the medication in the next 1-2 days.   This is time sensitive and if you are not selected in the next 1-2 days, you will not receive the medication.  If you do not receive a call to schedule, you have not been selected.\"      Review information with Patient    Your result was positive. This means you have COVID-19 (coronavirus).  We have sent you a letter that reviews the information that I'll be reviewing with you now.    How can I protect others?    If you have symptoms: stay home and away from others (self-isolate) until:    You've had no fever--and no medicine that reduces fever--for 1 full day (24 hours). And       Your other symptoms have gotten better. For example, your cough or breathing has improved. And     At least 10 days have passed since your symptoms started. (If you've been told by a doctor that you have a weak immune system, wait 20 days.)     If you don't have symptoms: Stay home and away from others (self-isolate) until at least 10 days have passed since your first positive COVID-19 test. (Date test collected)    During this time:    Stay in your own room, including for meals. Use your own bathroom if you can.    Stay away from others in your home. No hugging, kissing or shaking hands. No visitors.     Don't go to work, school or anywhere else. "     Clean  high touch  surfaces often (doorknobs, counters, handles, etc.). Use a household cleaning spray or wipes. You'll find a full list on the EPA website at www.epa.gov/pesticide-registration/list-n-disinfectants-use-against-sars-cov-2.     Cover your mouth and nose with a mask, tissue or other face covering to avoid spreading germs.    Wash your hands and face often with soap and water.    Make a list of people you have been in close contact with recently, even if either of you wore a face covering.   ; Start your list from 2 days before you became ill or had a positive test.  ; Include anyone that was within 6 feet of you for a cumulative total of 15 minutes or more in 24 hours. (Example: if you sat next to Venancio for 5 minutes in the morning and 10 minutes in the afternoon, then you were in close contact for 15 minutes total that day. Venancio would be added to your list.)    A public health worker will call or text you. It is important that you answer. They will ask you questions about possible exposures to COVID-19, such as people you have been in direct contact with and places you have visited.    Tell the people on your list that you have COVID-19; they should stay away from others for 14 days starting from the last time they were in contact with you (unless you are told something different from a public health worker).     Caregivers in these groups are at risk for severe illness due to COVID-19:  o People 65 years and older  o People who live in a nursing home or long-term care facility  o People with chronic disease (lung, heart, cancer, diabetes, kidney, liver, immunologic)  o People who have a weakened immune system, including those who:  - Are in cancer treatment  - Take medicine that weakens the immune system, such as corticosteroids  - Had a bone marrow or organ transplant  - Have an immune deficiency  - Have poorly controlled HIV or AIDS  - Are obese (body mass index of 40 or higher)  - Smoke  regularly    Caregivers should wear gloves while washing dishes, handling laundry and cleaning bedrooms and bathrooms.    Wash and dry laundry with special caution. Don't shake dirty laundry, and use the warmest water setting you can.    If you have a weakened immune system, ask your doctor about other actions you should take.    For more tips, go to www.cdc.gov/coronavirus/2019-ncov/downloads/10Things.pdf.    You should not go back to work until you meet the guidelines above for ending your home isolation. You don't need to be retested for COVID-19 before going back to work--studies show that you won't spread the virus if it's been at least 10 days since your symptoms started (or 20 days, if you have a weak immune system).    Employers: This document serves as formal notice of your employee's medical guidelines for going back to work. They must meet the above guidelines before going back to work in person.    How can I take care of myself?    1. Get lots of rest. Drink extra fluids (unless a doctor has told you not to).    2. Take Tylenol (acetaminophen) for fever or pain. If you have liver or kidney problems, ask your family doctor if it's okay to take Tylenol.     Take either:     650 mg (two 325 mg pills) every 4 to 6 hours, or     1,000 mg (two 500 mg pills) every 8 hours as needed.     Note: Don't take more than 3,000 mg in one day. Acetaminophen is found in many medicines (both prescribed and over-the-counter medicines). Read all labels to be sure you don't take too much.    For children, check the Tylenol bottle for the right dose (based on their age or weight).    3. If you have other health problems (like cancer, heart failure, an organ transplant or severe kidney disease): Call your specialty clinic if you don't feel better in the next 2 days.    4. Know when to call 911: Emergency warning signs include:    Trouble breathing or shortness of breath    Pain or pressure in the chest that doesn't go  away    Feeling confused like you haven't felt before, or not being able to wake up    Bluish-colored lips or face    5. Sign up for [employer]. We know it's scary to hear that you have COVID-19. We want to track your symptoms to make sure you're okay over the next 2 weeks. Please look for an email from [employer]--this is a free, online program that we'll use to keep in touch. To sign up, follow the link in the email. Learn more at [email or web address].    Where can I get more information?    [facility]: [email or web address]    Coronavirus Basics: www.health.[facility].[place]./diseases/coronavirus/basics.html    What to Do If You're Sick: www.cdc.gov/coronavirus/2019-ncov/about/steps-when-sick.html    Ending Home Isolation: www.cdc.gov/coronavirus/2019-ncov/hcp/disposition-in-home-patients.html     Caring for Someone with COVID-19: www.cdc.gov/coronavirus/2019-ncov/if-you-are-sick/care-for-someone.html     [facility] clinical trials (COVID-19 research studies): clinicalaffairs.[facility]-clinical-trials     A Positive COVID-19 letter will be sent via [employer] or the mail. (Exception, no letters sent to Presurgerical/Preprocedure Patients)    Marlene Hays LPN                            Coronavirus (COVID-19) Notification    Reason for call  Notify of POSITIVE  COVID-19 lab result, assess symptoms,  review [facility] recommendations    Lab Result   Lab test for 2019-nCoV rRt-PCR or SARS-COV-2 PCR  Oropharyngeal AND/OR nasopharyngeal swabs were POSITIVE for 2019-nCoV RNA [OR] SARS-COV-2 RNA (COVID-19) RNA     We have been unable to reach Patient by phone at this time to notify of their Positive COVID-19 result.  Left voicemail message requesting a call back to [phone] [facility] for results.        POSITIVE COVID-19 Letter sent.    Marlene Hays LPN

## 2021-03-25 NOTE — RESULT ENCOUNTER NOTE
I spoke with the patient via telephone call and communicated these results with the assistance of a professional . Self quarantine recommendations were discussed.    Vince Conklin MD  March 25, 2021

## 2021-03-29 ENCOUNTER — VIRTUAL VISIT (OUTPATIENT)
Dept: FAMILY MEDICINE | Facility: CLINIC | Age: 41
End: 2021-03-29
Payer: COMMERCIAL

## 2021-03-29 DIAGNOSIS — U07.1 COVID-19 VIRUS INFECTION: Primary | ICD-10-CM

## 2021-03-29 PROCEDURE — 99212 OFFICE O/P EST SF 10 MIN: CPT | Mod: 95 | Performed by: STUDENT IN AN ORGANIZED HEALTH CARE EDUCATION/TRAINING PROGRAM

## 2021-03-29 NOTE — NURSING NOTE
Due to patient being non-English speaking/uses sign language, an  was used for this visit. Only for face-to-face interpretation by an external agency, date and length of interpretation can be found on the scanned worksheet.       name: Tino  Language: Bonny  Agency: [employer]  Phone number: [phone]        KELLEY Matthews  8:11 AM  3/29/2021

## 2021-03-29 NOTE — LETTER
[facility] [address] [place] [address]  [phone]      March 29, 2021    RE:  Clara Escobar                                                                                                                                                       [address] [place] [address]            To whom it may concern:    Clara Escobar is under my professional care for COVID-19. It has been greater than 10 days since symptom onset and greater than 24 hours without any symptoms. Therefore per CDC guidelines he  may return to work without restrictions        Sincerely,        Miguelangel Araya MD    [facility]

## 2021-03-29 NOTE — PROGRESS NOTES
Clara is a 40 year old who is being evaluated via a billable telephone visit.      What phone number would you like to be contacted at? [phone]  How would you like to obtain your AVS? Mail a copy    Assessment & Plan     COVID-19 virus infection  Greater than 10 days have elapsed since symptom onset with greater than 24 hours without fever or severe symptoms. He feels well today. Pt counseled that he can end his quarantine and return to work. Letter written. Pt counseled for quarantine of his family members. 14 days from yesterday for his wife who is asymptomatic and has not had any testing and 10 days from the date of positive testing for his children.       No follow-ups on file.    Precepted with Dr. Wilson Araya MD  [facility]   Clara is a 40 year old who presents for the following health issues:   A [employer] phone  was used for this visit     HPI   Pt calls in to inquire about the results of his COVID-19 PCR swab. He was positive via PCR testing on 3/23/21. Pt's symptoms started on Thursday 18 and got worse on Friday the 19th. He has stayed out of work since the 19th. His symptoms included body aches and coughing. He states he is now feeling better. He denies ever having difficulty breathing, and has had no fever. It has now been >10 days since his symptom onset and he has had no fever for >24hours.     Pt has two children who also tested positive. He has 4 children. 2 tested positive, 1 they are awaiting results for, and 1 is too young to test.     Review of Systems   Constitutional, HEENT, cardiovascular, pulmonary, gi and gu systems are negative, except as otherwise noted.      Objective           Vitals:  No vitals were obtained today due to virtual visit.    Physical Exam   healthy, alert and no distress  PSYCH: Alert and oriented times 3; coherent speech, normal   rate and volume, able to articulate logical thoughts, able   to abstract  reason, no tangential thoughts, no hallucinations   or delusions  His affect is normal  RESP: No cough, no audible wheezing, able to talk in full sentences  Remainder of exam unable to be completed due to telephone visits        Phone call duration: 15 minutes

## 2021-03-29 NOTE — PROGRESS NOTES
Preceptor Attestation:   I discussed the patient with the resident. I talked to the patient on the phone. I have verified the content of the note, which accurately reflects my assessment of the patient and the plan of care.   Supervising Physician:  Wilson Rice MD.

## 2022-05-18 ENCOUNTER — TELEPHONE (OUTPATIENT)
Dept: FAMILY MEDICINE | Facility: CLINIC | Age: 42
End: 2022-05-18

## 2022-05-18 NOTE — TELEPHONE ENCOUNTER
Clara Escobar called to schedule an appointment for TB screening.        TB Screening questions  1. Have you had recent contact with a person with active tuberculosis (TB)?  No, continue to next question.  2. Have you ever been treated for tuberculosis (TB) or latent TB before?  No, continue to next question.  3. Has a county worker or another healthcare worker (not your employer) told you to come in to be tested for TB?  No, continue to next question.  4. Have you had a live vaccine (smallpox, flumist, MMR, varicella, oral polio and/or yellow fever) in the last 4 weeks?  No, lab visit scheduled.       Lab visit scheduled for 05/19 AT 9:00.    Dc De Santiago

## 2022-05-19 ENCOUNTER — LAB (OUTPATIENT)
Dept: LAB | Facility: CLINIC | Age: 42
End: 2022-05-19
Payer: COMMERCIAL

## 2022-05-19 DIAGNOSIS — Z11.1 SCREENING EXAMINATION FOR PULMONARY TUBERCULOSIS: Primary | ICD-10-CM

## 2022-05-19 PROCEDURE — 36415 COLL VENOUS BLD VENIPUNCTURE: CPT

## 2022-05-19 PROCEDURE — 86481 TB AG RESPONSE T-CELL SUSP: CPT

## 2022-05-19 NOTE — PROGRESS NOTES
Clara Escobar presents for a blood draw TB screening test.    TB Screening questions  1. Have you had recent contact with a person with active tuberculosis (TB)?  No, continue to next question.  2. Have you ever been treated for tuberculosis (TB) or latent TB before?  No, continue to next question.  3. Has a county worker or another healthcare worker (not your employer) told you to come in to be tested for TB?  No, continue to next question.  4. Have you had a live vaccine (smallpox, flumist, MMR, varicella, oral polio and/or yellow fever) in the last 4 weeks?  No, continued with lab visit/blood draw.    Educated patient about when to expect the lab results via phone/mail/[employer].    Jose Roberto Booth CMA

## 2022-05-21 LAB
GAMMA INTERFERON BACKGROUND BLD IA-ACNC: 0.13 IU/ML
M TB IFN-G BLD-IMP: NEGATIVE
M TB IFN-G CD4+ BCKGRND COR BLD-ACNC: 9.87 IU/ML
MITOGEN IGNF BCKGRD COR BLD-ACNC: -0.03 IU/ML
MITOGEN IGNF BCKGRD COR BLD-ACNC: 0.11 IU/ML
QUANTIFERON MITOGEN: 10 IU/ML
QUANTIFERON NIL TUBE: 0.13 IU/ML
QUANTIFERON TB1 TUBE: 0.1 IU/ML
QUANTIFERON TB2 TUBE: 0.24

## 2022-08-31 ENCOUNTER — LAB (OUTPATIENT)
Dept: LAB | Facility: CLINIC | Age: 42
End: 2022-08-31
Payer: COMMERCIAL

## 2022-08-31 DIAGNOSIS — Z20.822 CLOSE EXPOSURE TO 2019 NOVEL CORONAVIRUS: ICD-10-CM

## 2022-08-31 PROCEDURE — U0003 INFECTIOUS AGENT DETECTION BY NUCLEIC ACID (DNA OR RNA); SEVERE ACUTE RESPIRATORY SYNDROME CORONAVIRUS 2 (SARS-COV-2) (CORONAVIRUS DISEASE [COVID-19]), AMPLIFIED PROBE TECHNIQUE, MAKING USE OF HIGH THROUGHPUT TECHNOLOGIES AS DESCRIBED BY CMS-2020-01-R: HCPCS

## 2022-08-31 PROCEDURE — U0005 INFEC AGEN DETEC AMPLI PROBE: HCPCS

## 2022-09-01 LAB — SARS-COV-2 RNA RESP QL NAA+PROBE: NEGATIVE

## 2022-09-27 NOTE — TELEPHONE ENCOUNTER
RECORDS RECEIVED FROM: external   REASON FOR VISIT: seizures   Date of Appt: 11/8/22   NOTES (FOR ALL VISITS) STATUS DETAILS   OFFICE NOTE from referring provider Care Everywhere SEE INPATIENT NOTES   OFFICE NOTE from other specialist Care Everywhere Dr Ken Chino @  Neurology:  6/6/22 8/28/20   DISCHARGE REPORT from the ER Care Everywhere [facility]:  9/24/22 8/11/20   MEDICATION LIST Care Everywhere    IMAGING  (FOR ALL VISITS)     MRI (HEAD, NECK, SPINE) N/A    CT (HEAD, NECK, SPINE) N/A

## 2022-11-08 ENCOUNTER — PRE VISIT (OUTPATIENT)
Dept: NEUROLOGY | Facility: CLINIC | Age: 42
End: 2022-11-08

## 2022-11-08 ENCOUNTER — OFFICE VISIT (OUTPATIENT)
Dept: NEUROLOGY | Facility: CLINIC | Age: 42
End: 2022-11-08
Payer: COMMERCIAL

## 2022-11-08 ENCOUNTER — ANCILLARY PROCEDURE (OUTPATIENT)
Dept: NEUROLOGY | Facility: CLINIC | Age: 42
End: 2022-11-08
Attending: PSYCHIATRY & NEUROLOGY
Payer: COMMERCIAL

## 2022-11-08 VITALS
WEIGHT: 145.5 LBS | BODY MASS INDEX: 26.44 KG/M2 | DIASTOLIC BLOOD PRESSURE: 91 MMHG | HEART RATE: 63 BPM | OXYGEN SATURATION: 99 % | SYSTOLIC BLOOD PRESSURE: 138 MMHG

## 2022-11-08 DIAGNOSIS — R56.9 SEIZURE (H): ICD-10-CM

## 2022-11-08 DIAGNOSIS — G40.219 PARTIAL EPILEPSY, WITH IMPAIRMENT OF CONSCIOUSNESS, WITH INTRACTABLE EPILEPSY (H): Primary | ICD-10-CM

## 2022-11-08 PROCEDURE — 95718 EEG PHYS/QHP 2-12 HR W/VEEG: CPT | Performed by: PSYCHIATRY & NEUROLOGY

## 2022-11-08 PROCEDURE — 95700 EEG CONT REC W/VID EEG TECH: CPT | Performed by: PSYCHIATRY & NEUROLOGY

## 2022-11-08 PROCEDURE — 95713 VEEG 2-12 HR CONT MNTR: CPT | Performed by: PSYCHIATRY & NEUROLOGY

## 2022-11-08 PROCEDURE — 99205 OFFICE O/P NEW HI 60 MIN: CPT | Performed by: PSYCHIATRY & NEUROLOGY

## 2022-11-08 RX ORDER — LEVETIRACETAM 750 MG/1
750 TABLET ORAL 2 TIMES DAILY
Qty: 60 TABLET | Refills: 11 | Status: SHIPPED | OUTPATIENT
Start: 2022-11-08 | End: 2023-02-28

## 2022-11-08 ASSESSMENT — PAIN SCALES - GENERAL: PAINLEVEL: NO PAIN (0)

## 2022-11-08 NOTE — LETTER
11/8/2022       RE: Clara Escobar  [address]  [place] [address]     Dear Colleague,    Thank you for referring your patient, Clara Escobar, to the [facility] [place] [facility]. Please see a copy of my visit note below.      [facility]:  NEW PATIENT EVALUATION         Service Date: 11/08/2022    HISTORY:  Mr. Clara Escobar is a 42-year-old man who was referred for evaluation of convulsive seizures.  The patient came alone to the visit and was able to provide some medical history through a digital remote [employer] .  I reviewed medical records on the [facility] chart.    Ictal semiology-history:   The patient reported that he has had 1 type of seizure, which began in 2017.  He thinks he has had 5 seizures in total spread out over about 5 years.  His most recent seizure occurred on 09/24/2022, at which time he had Emergency Department evaluation in a [facility] facility.  The patient stated that family members have observed his seizures and reported the abnormal movements to him.  These always begin when he notices left arm involuntary movements with large twisting or flailing movements for a few seconds, followed by loss of consciousness.  Afterwards, he always notes lethargy and confusion for an hour or two, or he immediately goes to sleep if permitted.  He reported that family members see him fall down and have shaking of his whole body while he is unconscious.  He may have had blood in his mouth or urinary incontinence when he regains awareness, but he was uncertain as to how often this has happened.    The patient denied that he has ever had a generalized convulsion, paroxysmal episode of unresponsive staring without falling, non-convulsive falling with unconsciousness, repetitive involuntary movements or posturing, sudden brief confusion, or other paroxysmal phenomena.  He denied any history of  "convulsive status epilepticus, or complex partial status epilepticus.  He denied any history of sudden involuntary jerks while fully awake, but he has had hypnic jerks.      He does think that some seizures may have been induced by eating \"the wrong food\", but he could not further specify this.    Epilepsy-seizure predispositions:   The patient has no family history of epilepsy or seizures.  He has no history of gestational or  injury, febrile convulsions, developmental delay, stroke, meningitis, encephalitis, significant head injury, or other epileptic predispositions.      Laboratory evaluations:   The patient [place] in  and lived in [place].  He thinks that his first seizure occurred in [place] and that he had a CT scan and an electroencephalogram, but he does not know the results of these tests.  The medical record shows electroencephalograms and MRIs scanning ordered in the [facility] system, but it appears that these were not completed.    Epilepsy therapeutics:   The patient was started on levetiracetam in [place], which he took for several years, without side effects.  He has not been on other antiseizure medications.  After moving to [place], levetiracetam was restarted in .    PAST MEDICAL HISTORY:    1.  Chronic epilepsy with generalized tonic-clonic seizures.  2.  History of systemic hypertension.    FAMILY HISTORY:  There is no family history of seizures or epilepsy, or of other neurological conditions.      PERSONAL AND SOCIAL HISTORY:  The patient grew up [place]  He attended school for \"a few years\".  [place] in , then moved from [place] to [place].  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  He lives with his wife and their 4 children, also with his parents in the home.  He denied using tobacco, illicit recreational drugs or ethanol, but he uses substances that " increase his alertness including caffeine-containing beverages, energy drinks and betel nut juice.    REVIEW OF SYSTEMS:  The patient denied history of memory disturbance, weakness, tremors or other abnormal involuntary movements, numbness or tingling, incoordination, falling or difficulty in walking, urinary or fecal incontinence, headache and other pain, except as described above.  The patient denied any history of severe depression or suicidal ideation, or severe anxiety.  He denied rashes and easy bruising.  The remainder of a 14-system symptom review was negative.    MEDICATIONS:  Levetiracetam 750 mg b.i.d.    PHYSICAL EXAMINATION:    On examination the patient was an alert man in no apparent distress.  Pulse 63.  Blood pressure 138/91 (but 132/82 on recheck).  Respirations 16 per minute.  Skull was normocephalic and atraumatic.  Neck was supple.      On neurological examination, the patient appeared alert and was fully oriented to person, place, time, and reason for visit.  Speech was reported to be in the Bonny language.  Cranial nerves III through XII were grossly normal.  Muscle masses, tones and strengths were normal throughout.  There was no pronator drift.  No spontaneous tremors, myoclonus, or other abnormal movements were observed.  Sensations of light touch, pin prick, vibration and proprioception were reportedly normal throughout.  The finger-nose-finger maneuvers were performed normally bilaterally.  Romberg maneuver was negative.  Regular, heel, toe, tandem and reverse tandem walking were normal.  Deep tendon reflexes were normal and symmetric throughout.  Toes were downgoing bilaterally.      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  The etiology of this is unclear from his history, and it will be very important to obtain a brain MRI to rule  out neoplasm or other potentially injurious lesions.  We also will obtain an outpatient electroencephalogram.    We discussed [place] driving laws in detail.  He appeared to clearly understand that he is prohibited from operating a motor vehicle within 3 months following any seizure or other episode with sudden unconsciousness or inability to sit up, and that he is required to report any future such seizure to the DPS within 30 days after the event.  I also recommended that he and his family review all of his other activities, and avoid any activities that might lead to self-injury or injury of others, within 3 months following any seizure with impaired awareness or impaired motor control.  Such activities include but are not limited to holding babies or young children at heights from which they might be injured if dropped, bathing infants or young children in situations in which they might drown without continuous interactive care by an adult who is fully capable at all times during the bath, operating power cutting or other tools, handling firearms, exposure to heights from which he might fall, exposure to vessels with hot cooking oil or water, and tub-bathing or swimming alone.      PLAN:    1.  I prescribed continuation of levetiracetam 750 mg b.i.d.  2.  Brain 3 Trena MRI without and with contrast.  3.  Outpatient 3-hour video EEG.  4.  Return visit in approximately 4 months.    I spent 78 minutes in this patient care, with 68 minutes in direct patient contact, and 10 minutes in chart review and document preparation.       Brandon Sultana M.D.   Professor of Neurology       D: 2022   T: 2022   MT: misha    Name:     JOHN LO  MRN:      [id number]        Account:      [id number]   :      1980           Service Date: 2022     Document: [id number]

## 2022-11-09 NOTE — PROGRESS NOTES
"  [facility]:  NEW PATIENT EVALUATION         Service Date: 11/08/2022    HISTORY:  Mr. Clara Escobar is a 42-year-old man who was referred for evaluation of convulsive seizures.  The patient came alone to the visit and was able to provide some medical history through a digital remote [facility] .  I reviewed medical records on the [facility] chart.    Ictal semiology-history:   The patient reported that he has had 1 type of seizure, which began in 2017.  He thinks he has had 5 seizures in total spread out over about 5 years.  His most recent seizure occurred on 09/24/2022, at which time he had Emergency Department evaluation in a [facility].  The patient stated that family members have observed his seizures and reported the abnormal movements to him.  These always begin when he notices left arm involuntary movements with large twisting or flailing movements for a few seconds, followed by loss of consciousness.  Afterwards, he always notes lethargy and confusion for an hour or two, or he immediately goes to sleep if permitted.  He reported that family members see him fall down and have shaking of his whole body while he is unconscious.  He may have had blood in his mouth or urinary incontinence when he regains awareness, but he was uncertain as to how often this has happened.    The patient denied that he has ever had a generalized convulsion, paroxysmal episode of unresponsive staring without falling, non-convulsive falling with unconsciousness, repetitive involuntary movements or posturing, sudden brief confusion, or other paroxysmal phenomena.  He denied any history of convulsive status epilepticus, or complex partial status epilepticus.  He denied any history of sudden involuntary jerks while fully awake, but he has had hypnic jerks.      He does think that some seizures may have been induced by eating \"the wrong food\", but he could not further specify " "this.    Epilepsy-seizure predispositions:   The patient has no family history of epilepsy or seizures.  He has no history of gestational or  injury, febrile convulsions, developmental delay, stroke, meningitis, encephalitis, significant head injury, or other epileptic predispositions.      Laboratory evaluations:   The [place] in  and lived in [facility].  He thinks that his first seizure occurred in [place] and that he had a CT scan and an electroencephalogram, but he does not know the results of these tests.  The medical record shows electroencephalograms and MRIs scanning ordered in the [facility] system, but it appears that these were not completed.    Epilepsy therapeutics:   The patient was started on levetiracetam in [place], which he took for several years, without side effects.  He has not been on other antiseizure medications.  After moving to [place], levetiracetam was restarted in .    PAST MEDICAL HISTORY:    1.  Chronic epilepsy with generalized tonic-clonic seizures.  2.  History of systemic hypertension.    FAMILY HISTORY:  There is no family history of seizures or epilepsy, or of other neurological conditions.      PERSONAL AND SOCIAL HISTORY:  The patient grew up in [place] ([facility]  He attended school for \"a few years\".  [place] in , then moved from [place] to [place].  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  He lives with his wife and their 4 children, also with his parents in the home.  He denied using tobacco, illicit recreational drugs or ethanol, but he uses substances that increase his alertness including caffeine-containing beverages, energy drinks and betel nut juice.    REVIEW OF SYSTEMS:  The patient denied history of memory disturbance, weakness, tremors or other abnormal involuntary movements, numbness or tingling, incoordination, falling or difficulty " in walking, urinary or fecal incontinence, headache and other pain, except as described above.  The patient denied any history of severe depression or suicidal ideation, or severe anxiety.  He denied rashes and easy bruising.  The remainder of a 14-system symptom review was negative.    MEDICATIONS:  Levetiracetam 750 mg b.i.d.    PHYSICAL EXAMINATION:    On examination the patient was an alert man in no apparent distress.  Pulse 63.  Blood pressure 138/91 (but 132/82 on recheck).  Respirations 16 per minute.  Skull was normocephalic and atraumatic.  Neck was supple.      On neurological examination, the patient appeared alert and was fully oriented to person, place, time, and reason for visit.  Speech was reported to be in the [place] language.  Cranial nerves III through XII were grossly normal.  Muscle masses, tones and strengths were normal throughout.  There was no pronator drift.  No spontaneous tremors, myoclonus, or other abnormal movements were observed.  Sensations of light touch, pin prick, vibration and proprioception were reportedly normal throughout.  The finger-nose-finger maneuvers were performed normally bilaterally.  Romberg maneuver was negative.  Regular, heel, toe, tandem and reverse tandem walking were normal.  Deep tendon reflexes were normal and symmetric throughout.  Toes were downgoing bilaterally.      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  The etiology of this is unclear from his history, and it will be very important to obtain a brain MRI to rule out neoplasm or other potentially injurious lesions.  We also will obtain an outpatient electroencephalogram.    We discussed [place] driving laws in detail.  He appeared to clearly understand that he is prohibited from operating a motor vehicle within 3 months following any seizure or  other episode with sudden unconsciousness or inability to sit up, and that he is required to report any future such seizure to the DPS within 30 days after the event.  I also recommended that he and his family review all of his other activities, and avoid any activities that might lead to self-injury or injury of others, within 3 months following any seizure with impaired awareness or impaired motor control.  Such activities include but are not limited to holding babies or young children at heights from which they might be injured if dropped, bathing infants or young children in situations in which they might drown without continuous interactive care by an adult who is fully capable at all times during the bath, operating power cutting or other tools, handling firearms, exposure to heights from which he might fall, exposure to vessels with hot cooking oil or water, and tub-bathing or swimming alone.      PLAN:    1.  I prescribed continuation of levetiracetam 750 mg b.i.d.  2.  Brain 3 Trena MRI without and with contrast.  3.  Outpatient 3-hour video EEG.  4.  Return visit in approximately 4 months.    I spent 78 minutes in this patient care, with 68 minutes in direct patient contact, and 10 minutes in chart review and document preparation.       Brandon Sultana M.D.   Professor of Neurology       D: 2022   T: 2022   MT: [email or web address]    Name:     JOHN LO  MRN:      [id number]        Account:      [id number]   :      1980           Service Date: 2022     Document: [id number]

## 2022-12-16 ENCOUNTER — ANCILLARY PROCEDURE (OUTPATIENT)
Dept: MRI IMAGING | Facility: CLINIC | Age: 42
End: 2022-12-16
Attending: PSYCHIATRY & NEUROLOGY
Payer: COMMERCIAL

## 2022-12-16 DIAGNOSIS — G40.219 PARTIAL EPILEPSY, WITH IMPAIRMENT OF CONSCIOUSNESS, WITH INTRACTABLE EPILEPSY (H): ICD-10-CM

## 2022-12-16 PROCEDURE — 70553 MRI BRAIN STEM W/O & W/DYE: CPT | Mod: GC | Performed by: RADIOLOGY

## 2022-12-16 PROCEDURE — A9585 GADOBUTROL INJECTION: HCPCS | Performed by: RADIOLOGY

## 2022-12-16 RX ORDER — GADOBUTROL 604.72 MG/ML
7.5 INJECTION INTRAVENOUS ONCE
Status: COMPLETED | OUTPATIENT
Start: 2022-12-16 | End: 2022-12-16

## 2022-12-16 RX ADMIN — GADOBUTROL 6.5 ML: 604.72 INJECTION INTRAVENOUS at 12:40

## 2023-02-28 ENCOUNTER — LAB (OUTPATIENT)
Dept: LAB | Facility: CLINIC | Age: 43
End: 2023-02-28
Payer: COMMERCIAL

## 2023-02-28 ENCOUNTER — OFFICE VISIT (OUTPATIENT)
Dept: NEUROLOGY | Facility: CLINIC | Age: 43
End: 2023-02-28
Payer: COMMERCIAL

## 2023-02-28 VITALS
OXYGEN SATURATION: 100 % | WEIGHT: 145.8 LBS | SYSTOLIC BLOOD PRESSURE: 149 MMHG | DIASTOLIC BLOOD PRESSURE: 92 MMHG | BODY MASS INDEX: 26.49 KG/M2 | HEART RATE: 82 BPM

## 2023-02-28 DIAGNOSIS — G40.219 PARTIAL EPILEPSY, WITH IMPAIRMENT OF CONSCIOUSNESS, WITH INTRACTABLE EPILEPSY (H): ICD-10-CM

## 2023-02-28 DIAGNOSIS — G40.219 PARTIAL EPILEPSY, WITH IMPAIRMENT OF CONSCIOUSNESS, WITH INTRACTABLE EPILEPSY (H): Primary | ICD-10-CM

## 2023-02-28 LAB — LEVETIRACETAM SERPL-MCNC: 17.9 ΜG/ML (ref 10–40)

## 2023-02-28 PROCEDURE — 80177 DRUG SCRN QUAN LEVETIRACETAM: CPT | Mod: 90 | Performed by: PATHOLOGY

## 2023-02-28 PROCEDURE — 99000 SPECIMEN HANDLING OFFICE-LAB: CPT | Performed by: PATHOLOGY

## 2023-02-28 PROCEDURE — 36415 COLL VENOUS BLD VENIPUNCTURE: CPT | Performed by: PATHOLOGY

## 2023-02-28 PROCEDURE — 99215 OFFICE O/P EST HI 40 MIN: CPT | Mod: GC | Performed by: PSYCHIATRY & NEUROLOGY

## 2023-02-28 RX ORDER — LEVETIRACETAM 750 MG/1
750 TABLET ORAL 2 TIMES DAILY
Qty: 60 TABLET | Refills: 11 | Status: SHIPPED | OUTPATIENT
Start: 2023-02-28

## 2023-02-28 ASSESSMENT — PAIN SCALES - GENERAL: PAINLEVEL: NO PAIN (0)

## 2023-02-28 NOTE — LETTER
2/28/2023       RE: Clara Escobar  [address]  [place] [address]     Dear Colleague,    Thank you for referring your patient, Clara Escobar, to the [facility] [place] [facility]. Please see a copy of my visit note below.      [facility]:  RETURN VISIT          Service Date: 02/28/2023     HISTORY:  Mr. Clara Escobar is a 42-year-old man who was referred for evaluation of convulsive seizures.  The patient came with his daughter to the visit today who was able to translate and provide some medical history.     He was last seen 11/2022 at which time he was continued on Keppra and recommended MRI and EEG. MRI completed and showed  a high right frontal lobe and right temporal lobe white matter change with associated encephalomalacia and enhancement,  indeterminate but consistent with an acute on chronic white matter process. EEG was normal with no pathological slowing, no interictal epileptiform abnormalities, no electrographic seizures and no paroxysmal behavioral events were recorded during the period of monitoring.     Today in talking with Mr. Escobar, he reports no concerns. He has had no seizures since last visit and no side effect concerns. He is taking his Keppra, 750 mg twice daily. He has no side effects or difficulty with compliance. He denies mood swings or changes to mood. He reports he is doing well. He confirms his last seizure was 9/2022. He is still working and denies concerns. He is driving again recently.     Ictal semiology-history:   The patient reported that he has had 1 type of seizure, which began in 2017.  He thinks he has had 5 seizures in total spread out over about 5 years.  His most recent seizure occurred on 09/24/2022, at which time he had Emergency Department evaluation in a [facility].  The patient stated that family members have observed his seizures and reported the abnormal  "movements to him.  These always begin when he notices left arm involuntary movements with large twisting or flailing movements for a few seconds, followed by loss of consciousness.  Afterwards, he always notes lethargy and confusion for an hour or two, or he immediately goes to sleep if permitted.  He reported that family members see him fall down and have shaking of his whole body while he is unconscious.  He may have had blood in his mouth or urinary incontinence when he regains awareness, but he was uncertain as to how often this has happened.    The patient denied that he has ever had a generalized convulsion, paroxysmal episode of unresponsive staring without falling, non-convulsive falling with unconsciousness, repetitive involuntary movements or posturing, sudden brief confusion, or other paroxysmal phenomena.  He denied any history of convulsive status epilepticus, or complex partial status epilepticus.  He denied any history of sudden involuntary jerks while fully awake, but he has had hypnic jerks.      He does think that some seizures may have been induced by eating \"the wrong food or drinks\", but he could not further specify this. His first seizure was in [place], due to drinking too much Monster, \"too much caffeine.\" He is no longer drinking monsters, but remains drinking a coffee 1-2 cups.     Epilepsy-seizure predispositions:   The patient has no family history of epilepsy or seizures.  He has no history of gestational or  injury, febrile convulsions, developmental delay, stroke, meningitis, encephalitis, significant head injury, or other epileptic predispositions.      Laboratory evaluations:   The patient initially [place] in  and lived in [facility].  He thinks that his first seizure occurred in [place] and that he had a CT scan and an electroencephalogram, but he does not know the results of these tests.  The medical record shows electroencephalograms " "and MRIs scanning ordered in the [facility] system, but it appears that these were not completed.    MRI: 12/2022                                                 IMPRESSION:  1. High right frontal lobe and right temporal lobe white matter changes with associated encephalomalacia and enhancement. These  findings are indeterminate but consistent with an acute on chronic white matter process.  2. No mesial temporal sclerosis.     EEG (3 hour) 11/2022-The interictal recording in waking, drowsiness and stage II sleep was normal.  No pathological slowing, no interictal epileptiform abnormalities, no electrographic seizures and no paroxysmal behavioral events were recorded during the period of monitoring.     Epilepsy therapeutics:   The patient was started on levetiracetam in [place], which he took for several years, without side effects.  He has not been on other antiseizure medications.  After moving to [place], levetiracetam was restarted in 2022.    PAST MEDICAL HISTORY:    1.  Chronic epilepsy with generalized tonic-clonic seizures.  2.  History of systemic hypertension.    FAMILY HISTORY:  There is no family history of seizures or epilepsy, or of other neurological conditions.      PERSONAL AND SOCIAL HISTORY:  The patient grew up in [place] ([place]  He attended school for \"a few years\".  [place], then moved from [place] to [place].    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  He lives with his wife and their 4 children, also with his parents in the home.  He denied using tobacco, illicit recreational drugs or ethanol, but he uses substances that increase his alertness including caffeine-containing beverages, energy drinks and betel nut juice.    REVIEW OF SYSTEMS:  The patient denied history of memory disturbance, weakness, tremors or other abnormal involuntary movements, numbness or tingling, incoordination, falling or difficulty in walking, " urinary or fecal incontinence, headache and other pain, except as described above.  The patient denied any history of severe depression or suicidal ideation, or severe anxiety.  He denied rashes and easy bruising.  The remainder of a 14-system symptom review was negative.    MEDICATIONS:  Levetiracetam 750 mg b.i.d.    PHYSICAL EXAMINATION:    General:  patient sitting in chair, mask and hat on, daughter at bedside, in no acute distress    HEENT:  normocephalic/atraumatic  Pulmonary:  no respiratory distress  MSK:  intact, warm/dry      Neurologic  Mental Status:  alert, oriented, follows commands, speech clear and fluent, naming and repetition normal  Cranial Nerves:  pupils equal, EOMI with normal smooth pursuit, facial sensation intact and symmetric, facial movements symmetric, hearing not formally tested but intact to conversation, palate elevation symmetric and uvula midline, no dysarthria but minimal spont speach in English, shoulder shrug strong bilaterally, tongue protrusion midline  Motor:  normal muscle tone and bulk, no abnormal movements, able to move all limbs spontaneously, strength 5/5 throughout upper and lower extremities, no pronator drift  Sensory:  light touch sensation intact and symmetric throughout upper and lower extremities  Coordination: no dysmetria on FNF, H2S smooth  Reflexes:  +2 throughout biceps, BR, no hoffmans, +2 at patella and achilles with no clonus, no asterixis  Station/Gait:  normal width, turn, arm swing, able to tandem forward and backwards, and heel and toe walk    IMPRESSION:    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 We recently obtained MRI and EEG and will explore MRI findings further and discuss need for further evaluation with neuro-radiology colleuges, while continue with medication management and obtain levels today. He is now driving and we discussed driving laws in detail today.    We discussed [place] driving laws in detail.  He appeared to clearly understand that he is prohibited from operating a motor vehicle within 3 months following any seizure or other episode with sudden unconsciousness or inability to sit up, and that he is required to report any future such seizure to the [facility] within 30 days after the event.      PLAN:    1.  Continue levetiracetam 750 mg b.i.d.  2.  Obtain levetiracetam level today  3.  Return visit in approximately 6 months.    Neurology staff is Dr. Brandon Castellano DO, MBA  PGY-3 Neurology Resident     Report Prepared By: Argentina Castellano DO, MBA, Neurology Resident   I agree with the findings and plan of care as documented.  I personally examined the patient, and discussed our epilepsy diagnostic impressions and therapeutic recommendations with the patient.  The patient was agreeable to this plan.    I spent 42 minutes in this patient care, with 25 minutes in direct patient contact, and 17 minutes in chart review and in inspection of the brain MR images.       Brandon Sultana M.D., Professor of Neurology

## 2023-02-28 NOTE — PROGRESS NOTES
[facility]:  RETURN VISIT          Service Date: 02/28/2023     HISTORY:  Mr. Clara Escobar is a 42-year-old man who was referred for evaluation of convulsive seizures.  The patient came with his daughter to the visit today who was able to translate and provide some medical history.     He was last seen 11/2022 at which time he was continued on Keppra and recommended MRI and EEG. MRI completed and showed  a high right frontal lobe and right temporal lobe white matter change with associated encephalomalacia and enhancement,  indeterminate but consistent with an acute on chronic white matter process. EEG was normal with no pathological slowing, no interictal epileptiform abnormalities, no electrographic seizures and no paroxysmal behavioral events were recorded during the period of monitoring.     Today in talking with Mr. Escobar, he reports no concerns. He has had no seizures since last visit and no side effect concerns. He is taking his Keppra, 750 mg twice daily. He has no side effects or difficulty with compliance. He denies mood swings or changes to mood. He reports he is doing well. He confirms his last seizure was 9/2022. He is still working and denies concerns. He is driving again recently.     Ictal semiology-history:   The patient reported that he has had 1 type of seizure, which began in 2017.  He thinks he has had 5 seizures in total spread out over about 5 years.  His most recent seizure occurred on 09/24/2022, at which time he had Emergency Department evaluation in a [facility].  The patient stated that family members have observed his seizures and reported the abnormal movements to him.  These always begin when he notices left arm involuntary movements with large twisting or flailing movements for a few seconds, followed by loss of consciousness.  Afterwards, he always notes lethargy and confusion for an hour or two, or he immediately goes to sleep if permitted.  He  "reported that family members see him fall down and have shaking of his whole body while he is unconscious.  He may have had blood in his mouth or urinary incontinence when he regains awareness, but he was uncertain as to how often this has happened.    The patient denied that he has ever had a generalized convulsion, paroxysmal episode of unresponsive staring without falling, non-convulsive falling with unconsciousness, repetitive involuntary movements or posturing, sudden brief confusion, or other paroxysmal phenomena.  He denied any history of convulsive status epilepticus, or complex partial status epilepticus.  He denied any history of sudden involuntary jerks while fully awake, but he has had hypnic jerks.      He does think that some seizures may have been induced by eating \"the wrong food or drinks\", but he could not further specify this. His first seizure was in [place], due to drinking too much Monster, \"too much caffeine.\" He is no longer drinking monsters, but remains drinking a coffee 1-2 cups.     Epilepsy-seizure predispositions:   The patient has no family history of epilepsy or seizures.  He has no history of gestational or  injury, febrile convulsions, developmental delay, stroke, meningitis, encephalitis, significant head injury, or other epileptic predispositions.      Laboratory evaluations:   The patient initially [place] in  and lived in [facility].  He thinks that his first seizure occurred in [place] and that he had a CT scan and an electroencephalogram, but he does not know the results of these tests.  The medical record shows electroencephalograms and MRIs scanning ordered in the [facility] system, but it appears that these were not completed.    MRI: 2022                                                 IMPRESSION:  1. High right frontal lobe and right temporal lobe white matter changes with associated encephalomalacia and enhancement. " "These  findings are indeterminate but consistent with an acute on chronic white matter process.  2. No mesial temporal sclerosis.     EEG (3 hour) 11/2022-The interictal recording in waking, drowsiness and stage II sleep was normal.  No pathological slowing, no interictal epileptiform abnormalities, no electrographic seizures and no paroxysmal behavioral events were recorded during the period of monitoring.     Epilepsy therapeutics:   The patient was started on levetiracetam in [place], which he took for several years, without side effects.  He has not been on other antiseizure medications.  After moving to [place], levetiracetam was restarted in 2022.    PAST MEDICAL HISTORY:    1.  Chronic epilepsy with generalized tonic-clonic seizures.  2.  History of systemic hypertension.    FAMILY HISTORY:  There is no family history of seizures or epilepsy, or of other neurological conditions.      PERSONAL AND SOCIAL HISTORY:  The patient grew up in [facility] [place]  He attended school for \"a few years\".  He moved [place] the [place] in [place], then moved from [place] to [place].    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  He lives with his wife and their 4 children, also with his parents in the home.  He denied using tobacco, illicit recreational drugs or ethanol, but he uses substances that increase his alertness including caffeine-containing beverages, energy drinks and betel nut juice.    REVIEW OF SYSTEMS:  The patient denied history of memory disturbance, weakness, tremors or other abnormal involuntary movements, numbness or tingling, incoordination, falling or difficulty in walking, urinary or fecal incontinence, headache and other pain, except as described above.  The patient denied any history of severe depression or suicidal ideation, or severe anxiety.  He denied rashes and easy bruising.  The remainder of a 14-system symptom review was negative.    MEDICATIONS:  " Levetiracetam 750 mg b.i.d.    PHYSICAL EXAMINATION:    General:  patient sitting in chair, mask and hat on, daughter at bedside, in no acute distress    HEENT:  normocephalic/atraumatic  Pulmonary:  no respiratory distress  MSK:  intact, warm/dry      Neurologic  Mental Status:  alert, oriented, follows commands, speech clear and fluent, naming and repetition normal  Cranial Nerves:  pupils equal, EOMI with normal smooth pursuit, facial sensation intact and symmetric, facial movements symmetric, hearing not formally tested but intact to conversation, palate elevation symmetric and uvula midline, no dysarthria but minimal spont speach in English, shoulder shrug strong bilaterally, tongue protrusion midline  Motor:  normal muscle tone and bulk, no abnormal movements, able to move all limbs spontaneously, strength 5/5 throughout upper and lower extremities, no pronator drift  Sensory:  light touch sensation intact and symmetric throughout upper and lower extremities  Coordination: no dysmetria on FNF, H2S smooth  Reflexes:  +2 throughout biceps, BR, no hoffmans, +2 at patella and achilles with no clonus, no asterixis  Station/Gait:  normal width, turn, arm swing, able to tandem forward and backwards, and heel and toe walk    IMPRESSION:    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 We recently obtained MRI and EEG and will explore MRI findings further and discuss need for further evaluation with neuro-radiology colleuges, while continue with  medication management and obtain levels today. He is now driving and we discussed driving laws in detail today.    We discussed [place] driving laws in detail.  He appeared to clearly understand that he is prohibited from operating a motor vehicle within 3 months following any seizure or other episode with sudden unconsciousness or inability to sit up, and that he is required to [facility] any future such [facility] within 30 days after the event.      PLAN:    1.  Continue levetiracetam 750 mg b.i.d.  2.  Obtain levetiracetam level today  3.  Return visit in approximately 6 months.    Neurology staff is Dr. Brandon Castellano DO, MBA  PGY-3 Neurology Resident     Report Prepared By: Argentina Castellano DO, MBA, Neurology Resident   I agree with the findings and plan of care as documented.  I personally examined the patient, and discussed our epilepsy diagnostic impressions and therapeutic recommendations with the patient.  The patient was agreeable to this plan.    I spent 42 minutes in this patient care, with 25 minutes in direct patient contact, and 17 minutes in chart review and in inspection of the brain MR images.   Brandon Sultana M.D., Professor of Neurology

## 2023-09-19 ENCOUNTER — ALLIED HEALTH/NURSE VISIT (OUTPATIENT)
Dept: FAMILY MEDICINE | Facility: CLINIC | Age: 43
End: 2023-09-19
Payer: COMMERCIAL

## 2023-09-19 DIAGNOSIS — Z23 NEED FOR PROPHYLACTIC VACCINATION AND INOCULATION AGAINST INFLUENZA: Primary | ICD-10-CM

## 2023-09-19 PROCEDURE — 90686 IIV4 VACC NO PRSV 0.5 ML IM: CPT

## 2023-09-19 PROCEDURE — 99207 PR NO BILLABLE SERVICE THIS VISIT: CPT

## 2023-09-19 PROCEDURE — 90471 IMMUNIZATION ADMIN: CPT

## 2024-09-18 ENCOUNTER — OFFICE VISIT (OUTPATIENT)
Dept: FAMILY MEDICINE | Facility: CLINIC | Age: 44
End: 2024-09-18
Payer: COMMERCIAL

## 2024-09-18 VITALS
SYSTOLIC BLOOD PRESSURE: 127 MMHG | OXYGEN SATURATION: 98 % | WEIGHT: 147 LBS | HEART RATE: 75 BPM | RESPIRATION RATE: 16 BRPM | BODY MASS INDEX: 26.71 KG/M2 | TEMPERATURE: 98.9 F | DIASTOLIC BLOOD PRESSURE: 86 MMHG

## 2024-09-18 DIAGNOSIS — G40.909 SEIZURE DISORDER (H): Primary | ICD-10-CM

## 2024-09-18 DIAGNOSIS — Z11.59 NEED FOR HEPATITIS C SCREENING TEST: ICD-10-CM

## 2024-09-18 DIAGNOSIS — Z11.4 SCREENING FOR HIV (HUMAN IMMUNODEFICIENCY VIRUS): ICD-10-CM

## 2024-09-18 PROCEDURE — 36415 COLL VENOUS BLD VENIPUNCTURE: CPT

## 2024-09-18 PROCEDURE — 87389 HIV-1 AG W/HIV-1&-2 AB AG IA: CPT

## 2024-09-18 PROCEDURE — 90471 IMMUNIZATION ADMIN: CPT

## 2024-09-18 PROCEDURE — 80053 COMPREHEN METABOLIC PANEL: CPT

## 2024-09-18 PROCEDURE — 86803 HEPATITIS C AB TEST: CPT

## 2024-09-18 PROCEDURE — 90472 IMMUNIZATION ADMIN EACH ADD: CPT

## 2024-09-18 PROCEDURE — 90746 HEPB VACCINE 3 DOSE ADULT IM: CPT

## 2024-09-18 PROCEDURE — 90656 IIV3 VACC NO PRSV 0.5 ML IM: CPT

## 2024-09-18 PROCEDURE — 82248 BILIRUBIN DIRECT: CPT

## 2024-09-18 PROCEDURE — 99204 OFFICE O/P NEW MOD 45 MIN: CPT | Mod: 25

## 2024-09-18 RX ORDER — IBUPROFEN 200 MG
200 TABLET ORAL EVERY 4 HOURS PRN
Qty: 30 TABLET | Refills: 2 | Status: SHIPPED | OUTPATIENT
Start: 2024-09-18

## 2024-09-18 RX ORDER — ACETAMINOPHEN 325 MG/1
325-650 TABLET ORAL EVERY 6 HOURS PRN
Qty: 30 TABLET | Refills: 0 | Status: SHIPPED | OUTPATIENT
Start: 2024-09-18

## 2024-09-18 RX ORDER — LEVETIRACETAM 750 MG/1
750 TABLET ORAL 2 TIMES DAILY
Qty: 60 TABLET | Refills: 3 | Status: SHIPPED | OUTPATIENT
Start: 2024-09-18

## 2024-09-18 NOTE — LETTER
2024    Ler Escobar   1980        To Whom it May Concern;    Please excuse Clara Escobar from work/ for a healthcare visit due to chronic illness from Sep .    Sincerely,        Cassy Galan, DO

## 2024-09-18 NOTE — PROGRESS NOTES
Assessment & Plan     Seizure disorder (H)  (primary encounter diagnosis)  The patient had a previous seizure activity the day prior to today's visit and is here for an ED follow up. See HPI below for more details. The patient reports of a frontal headache and dizziness since the seizure. Advised the patient to follow up with neurology for further workup. Labs listed below were ordered and pending. Advised the patient to refrain from driving in the next 3 months, per [place] law. Also advised the patient to report his recent seizure activity on the Department of Transportation.   -Hepatic function panel ordered and pending   -BMP ordered and pending    -Prescribed Keppra 750 mg BID with refills.   -He may take Tylenol 325mg and Ibuprofen 200mg as needed for his frontal headache secondary to seziure.   -Referral to adult neurology was made.       Screening for HIV (human immunodeficiency virus)  The patient was not seen by his PCP for a yearly preventative visit in >3 years. The patient is amendable to an HIV screening at this time.   -HIV screen pending     Need for hepatitis C screening test  The patient was not seen by his PCP for a yearly preventative visit in >3 years. The patient is amendable to an HIV screening at this time.   - Hepatitis C Screen Reflex to HCV RNA Quant and Genotype pending      CONSULTATION/REFERRAL to Adult neurology.    No follow-ups on file.    Subjective   Ler is a 44 year old, presenting for the following health issues:  Follow Up (Meds)      9/18/2024     3:44 PM   Additional Questions   Roomed by Preston RAHMAN   Accompanied by Daughter         9/18/2024    Information    services provided? Yes   Language Bonny Elizabeth Escobar is a 44 year old male who presents to the clinic after an ED visit after a seizure event. The patient is present at the clinic with his daughter, who drove the patient to today's visit. The patient reports he was working and felt that his  hands and legs felt tight. As this feels similar to his previous seizure episodes, he called out for help. A coworker was able to catch him when he lost censoriousness. He was noted to have upper and lower extremity tonic-clonic movements and facial twitching. He did not hit his head as his friend held him. This episode occurred for about 10 minutes and self-resolved.   Since his last seizure on 9/17, he continues to have some dizziness and frontal headaches. Denies of visual changes at this time. He was started on levetiracetam 750mg as this was previously prescribed by neurology in 2/2023. The patient has a history of seizures; his last seizure was in 9/24/2022 and did see neurology fairly consistently in [id number]. However, he was lost to follow up with neurology since then and has not taken his levetiracetam. Otherwise, the patient denies of other symptoms or concerns at this time.     Summary of hospitalization:  CareEverywhere information obtained and reviewed  Diagnostic Tests/Treatments reviewed.    Other Healthcare Providers Involved in Patient s Care:         None  Update since discharge: improved.     Plan of care communicated with patient and family     Review of Systems  Constitutional, HEENT, cardiovascular, pulmonary, gi and gu systems are negative, except as otherwise noted.      Objective    /86   Pulse 75   Temp 98.9  F (37.2  C) (Oral)   Resp 16   Wt 66.7 kg (147 lb)   SpO2 98%   BMI 26.71 kg/m    Body mass index is 26.71 kg/m .  Physical Exam   GENERAL: alert and no distress  EYES: Eyes grossly normal to inspection, PERRL and conjunctivae and sclerae normal  HENT: ear canals and TM's normal, nose and mouth without ulcers or lesions  NECK: no adenopathy, no asymmetry, masses, or scars  RESP: lungs clear to auscultation - no rales, rhonchi or wheezes  CV: regular rate and rhythm, normal S1 S2, no S3 or S4, no murmur, click or rub, no peripheral edema  ABDOMEN: soft, nontender, no  hepatosplenomegaly, no masses   MS: no gross musculoskeletal defects noted, no edema  SKIN: no suspicious lesions or rashes  NEURO: Normal strength and tone, sensory exam grossly normal, mentation intact, and speech normal  PSYCH: mentation appears normal, affect normal/bright    I precepted this patient with Dr. Juarez.   Signed Electronically by: Cassy Galan DO, PGY1  [facility]

## 2024-09-18 NOTE — PATIENT INSTRUCTIONS
Please refrain from driving for 3 months.   Please report your recent seizure activity: [email or web address]

## 2024-09-19 LAB
ALBUMIN SERPL BCG-MCNC: 4.3 G/DL (ref 3.5–5.2)
ALP SERPL-CCNC: 111 U/L (ref 40–150)
ALT SERPL W P-5'-P-CCNC: 48 U/L (ref 0–70)
ANION GAP SERPL CALCULATED.3IONS-SCNC: 9 MMOL/L (ref 7–15)
AST SERPL W P-5'-P-CCNC: 32 U/L (ref 0–45)
BILIRUB DIRECT SERPL-MCNC: <0.2 MG/DL (ref 0–0.3)
BILIRUB SERPL-MCNC: 0.4 MG/DL
BUN SERPL-MCNC: 11 MG/DL (ref 6–20)
CALCIUM SERPL-MCNC: 9.2 MG/DL (ref 8.8–10.4)
CHLORIDE SERPL-SCNC: 105 MMOL/L (ref 98–107)
CREAT SERPL-MCNC: 1.22 MG/DL (ref 0.67–1.17)
EGFRCR SERPLBLD CKD-EPI 2021: 75 ML/MIN/1.73M2
FASTING STATUS PATIENT QL REPORTED: ABNORMAL
FASTING STATUS PATIENT QL REPORTED: ABNORMAL
GLUCOSE SERPL-MCNC: 103 MG/DL (ref 70–99)
GLUCOSE SERPL-MCNC: 103 MG/DL (ref 70–99)
HCO3 SERPL-SCNC: 25 MMOL/L (ref 22–29)
HCV AB SERPL QL IA: NONREACTIVE
HIV 1+2 AB+HIV1 P24 AG SERPL QL IA: NONREACTIVE
POTASSIUM SERPL-SCNC: 4 MMOL/L (ref 3.4–5.3)
PROT SERPL-MCNC: 7.6 G/DL (ref 6.4–8.3)
SODIUM SERPL-SCNC: 139 MMOL/L (ref 135–145)

## 2024-10-14 ENCOUNTER — OFFICE VISIT (OUTPATIENT)
Dept: FAMILY MEDICINE | Facility: CLINIC | Age: 44
End: 2024-10-14
Payer: COMMERCIAL

## 2024-10-14 VITALS
WEIGHT: 144 LBS | OXYGEN SATURATION: 97 % | SYSTOLIC BLOOD PRESSURE: 150 MMHG | HEIGHT: 62 IN | DIASTOLIC BLOOD PRESSURE: 103 MMHG | RESPIRATION RATE: 20 BRPM | TEMPERATURE: 98.9 F | BODY MASS INDEX: 26.5 KG/M2 | HEART RATE: 85 BPM

## 2024-10-14 DIAGNOSIS — G40.909 SEIZURE DISORDER (H): Primary | ICD-10-CM

## 2024-10-14 DIAGNOSIS — S46.819A STRAIN OF TRAPEZIUS MUSCLE, UNSPECIFIED LATERALITY, INITIAL ENCOUNTER: ICD-10-CM

## 2024-10-14 DIAGNOSIS — M54.2 NECK PAIN: ICD-10-CM

## 2024-10-14 DIAGNOSIS — R79.89 ELEVATED SERUM CREATININE: ICD-10-CM

## 2024-10-14 DIAGNOSIS — R42 DIZZINESS: ICD-10-CM

## 2024-10-14 DIAGNOSIS — I10 ESSENTIAL HYPERTENSION: ICD-10-CM

## 2024-10-14 DIAGNOSIS — G93.89 ENCEPHALOMALACIA ON IMAGING STUDY: ICD-10-CM

## 2024-10-14 PROCEDURE — 80048 BASIC METABOLIC PNL TOTAL CA: CPT

## 2024-10-14 PROCEDURE — 99214 OFFICE O/P EST MOD 30 MIN: CPT | Mod: GC

## 2024-10-14 PROCEDURE — 36415 COLL VENOUS BLD VENIPUNCTURE: CPT

## 2024-10-14 RX ORDER — METHOCARBAMOL 500 MG/1
500 TABLET, FILM COATED ORAL 4 TIMES DAILY PRN
Qty: 15 TABLET | Refills: 0 | Status: SHIPPED | OUTPATIENT
Start: 2024-10-14

## 2024-10-14 RX ORDER — OLMESARTAN MEDOXOMIL 20 MG/1
10 TABLET ORAL DAILY
Qty: 30 TABLET | Refills: 0 | Status: SHIPPED | OUTPATIENT
Start: 2024-10-14

## 2024-10-14 RX ORDER — LEVETIRACETAM 750 MG/1
750 TABLET ORAL 2 TIMES DAILY
Qty: 60 TABLET | Refills: 5 | Status: SHIPPED | OUTPATIENT
Start: 2024-10-14

## 2024-10-14 NOTE — PROGRESS NOTES
"  Assessment & Plan     Seizure disorder (H)  Dizziness  Encephalomalacia on imaging study  Lightheadedness upon standing since breakthrough seizure in 09/2024, daily occurrence, does not seem to be improving and is kept him from work.  Previous brain MRI in 2022 showed encephalomalacia, no follow-up.  Did not receive brain imaging at recent ED visit, per note it states that he declined however upon asking the patient he states he did not decline this.  Given functional impairment caused by the symptoms and growing chronicity, will eval with MRI brain with and without.  - levETIRAcetam (KEPPRA) 750 MG tablet; Take 1 tablet (750 mg) by mouth 2 times daily.  - MR Brain Epilepsy wo & w Contrast; Future  - MR Brain Epilepsy wo & w Contrast; Future    Neck pain  Trapezius strain  Exam findings consistent with trapezius strain at cervical insertion, dense tissue texture changes.  Negative Spurling's, in fact compression resulted in improvement in symptoms.  Will trial diclofenac gel, short course of muscle relaxer, and refer for PT. no evidence of nuchal rigidity, negative Brudzinski, afebrile and vitally stable.  - diclofenac (VOLTAREN) 1 % topical gel; Apply 2 g topically 4 times daily as needed for moderate pain  -PT referral  -Methocarbamol 500 mg p.o. 1 tablet up to 4 times per day as needed for neck pain.  Dispense 15 tablets.  No refills.    Elevated serum creatinine  Recheck Cr as baseline since starting antihypertensive therapy  - Basic metabolic panel; Future    Essential hypertension  Multiple elevated pressures since 2020, will start on ARB but recheck BMP given elevated serum creatinine 3 weeks ago.  - olmesartan (BENICAR) 20 MG tablet; Take 0.5 tablets (10 mg) by mouth daily.      BMI  Estimated body mass index is 26.4 kg/m  as calculated from the following:    Height as of this encounter: 1.573 m (5' 1.93\").    Weight as of this encounter: 65.3 kg (144 lb).       No follow-ups on file.    Subjective   Ler " "is a 44 year old, presenting for the following health issues:  Letter Request (Patient has been sick for month, did not go to school and request a letter for work. 2 week ago went to work but need to stop due to seizure) and Pain (Body aches and neck stiff due to seizure)      10/14/2024     2:56 PM   Additional Questions   Roomed by msy   Accompanied by self         10/14/2024   Forms   Any forms needing to be completed Yes          10/14/2024    Information    services provided? Yes   Language Bonny   Type of interpretation provided Face-to-face    name adriane kia    [employer]        hospitals   44-year-old male with personal medical history of epilepsy, elevated blood pressure without diagnosis of hypertension, presenting to clinic with approximately 1 month of lightheadedness.  He states this onset after a seizure on 9/17/2024.  Constant throughout the day.  Worsening with standing.  He has not gone to work as he is worried he will fall down or have another seizure given its persistence.  No recent illnesses.  No fevers or chills.    Additionally endorses neck pain for the past few weeks as well.  States his muscles feel tight.  It improves when his wife massages his neck.  No falls or trauma or history of injury.  Has not taken anything for the pain.  No fevers or chills.  Tolerating oral intake well.  No nausea or vomiting.        Objective    BP (!) 150/103 (BP Location: Left arm, Patient Position: Sitting, Cuff Size: Adult Regular)   Pulse 85   Temp 98.9  F (37.2  C) (Oral)   Resp 20   Ht 1.573 m (5' 1.93\")   Wt 65.3 kg (144 lb)   SpO2 97%   BMI 26.40 kg/m    Body mass index is 26.4 kg/m .  Physical Exam   GENERAL: alert and no distress  NECK: no adenopathy, no asymmetry, masses, or scars.  Mild tenderness to palpation to paravertebral musculature/insertion of trapezius.  Negative Brudzinski test.  Negative Spurling's bilaterally.  RESP: lungs clear to " auscultation - no rales, rhonchi or wheezes  CV: regular rate and rhythm, normal S1 S2, no S3 or S4, no murmur, click or rub, no peripheral edema  ABDOMEN: soft, nontender, no hepatosplenomegaly, no masses and bowel sounds normal  MS: no gross musculoskeletal defects noted, no edema.  See neck exam.        Signed Electronically by: Gene Rea DO

## 2024-10-14 NOTE — PROGRESS NOTES
Preceptor Attestation:    I discussed the patient with the resident and evaluated the patient in person. I have verified the content of the note, which accurately reflects my assessment of the patient and the plan of care.   Supervising Physician:  Karthik Rico MD.

## 2024-10-15 LAB
ANION GAP SERPL CALCULATED.3IONS-SCNC: 11 MMOL/L (ref 7–15)
BUN SERPL-MCNC: 11.1 MG/DL (ref 6–20)
CALCIUM SERPL-MCNC: 9.9 MG/DL (ref 8.8–10.4)
CHLORIDE SERPL-SCNC: 101 MMOL/L (ref 98–107)
CREAT SERPL-MCNC: 1.2 MG/DL (ref 0.67–1.17)
EGFRCR SERPLBLD CKD-EPI 2021: 77 ML/MIN/1.73M2
GLUCOSE SERPL-MCNC: 96 MG/DL (ref 70–99)
HCO3 SERPL-SCNC: 27 MMOL/L (ref 22–29)
POTASSIUM SERPL-SCNC: 3.9 MMOL/L (ref 3.4–5.3)
SODIUM SERPL-SCNC: 139 MMOL/L (ref 135–145)

## 2024-10-28 ENCOUNTER — OFFICE VISIT (OUTPATIENT)
Dept: FAMILY MEDICINE | Facility: CLINIC | Age: 44
End: 2024-10-28
Payer: COMMERCIAL

## 2024-10-28 VITALS
SYSTOLIC BLOOD PRESSURE: 121 MMHG | DIASTOLIC BLOOD PRESSURE: 85 MMHG | TEMPERATURE: 98.4 F | WEIGHT: 145.8 LBS | HEART RATE: 77 BPM | RESPIRATION RATE: 16 BRPM | BODY MASS INDEX: 26.73 KG/M2 | OXYGEN SATURATION: 98 %

## 2024-10-28 DIAGNOSIS — R11.0 NAUSEA: ICD-10-CM

## 2024-10-28 DIAGNOSIS — R42 DIZZINESS: ICD-10-CM

## 2024-10-28 DIAGNOSIS — G93.89 ENCEPHALOMALACIA ON IMAGING STUDY: ICD-10-CM

## 2024-10-28 DIAGNOSIS — I10 ESSENTIAL HYPERTENSION: ICD-10-CM

## 2024-10-28 DIAGNOSIS — G40.909 SEIZURE DISORDER (H): Primary | ICD-10-CM

## 2024-10-28 PROCEDURE — 99214 OFFICE O/P EST MOD 30 MIN: CPT | Mod: GC

## 2024-10-28 RX ORDER — ONDANSETRON 4 MG/1
4 TABLET, ORALLY DISINTEGRATING ORAL EVERY 8 HOURS PRN
Qty: 30 TABLET | Refills: 1 | Status: SHIPPED | OUTPATIENT
Start: 2024-10-28

## 2024-10-28 NOTE — PROGRESS NOTES
Assessment & Plan     Seizure disorder (H)  Encephalomalacia on imaging study  Known seizure disorder, established with neurology. Current regimen includes Keppra 750 mg BID, breakthrough seizure on 9/17/24 without medication change. I recommend following up with neurology asap for breakthrough seizure. Discussed with referral coordinator who contacted neurology clinic and was able to get patient in for 1/3/25 (previously scheduled for 1/22/25). Concern that his symptoms today may be a side effect of medication or poorly controlled seizure disorder. Previous brain MRI in 2022 with encephalomalacia, no follow-up. MRI ordered at recent visit, referral coordinator assisted with scheduling this procedure today for day of follow up with me.     Dizziness  Nausea  Providing symptomatic relief with zofran per patient request.   - ondansetron (ZOFRAN ODT) 4 MG ODT tab  Dispense: 30 tablet; Refill: 1    Essential hypertension  Patient states he is not regularly taking his newly prescribed medication as he regularly checks BP at home and it is largely within normal limits. Normal BP today, will continue to monitor at upcoming visits. Will not collect monitoring BMP due to poor adherence to medication.             Return in about 2 weeks (around 11/11/2024) for Follow up.      Subjective   Ler is a 44 year old, presenting for the following health issues:  Nausea (Nausea and dizzy and head feels really heavy for the past month /Muscle pain )        10/14/2024     2:56 PM   Additional Questions   Roomed by michael   Accompanied by self     HPI     44-year-old male with history of epilepsy with recent breakthrough seizure in 09/2024 presents for ongoing complaints of dizziness and lightheadedness  This has been going on since his seizure on 9/17/24, has been previously seen in clinic for these complaints on 10/14, MR of the brain was ordered but not yet completed  These symptoms are constant throughout each day, worse with  standing  He has not had any falls or LOC but is worried about these and has not been working  Nausea is also occurring now but denies vomiting, no recent illnesses     He also has left arm pain that is constant, feels like a lot of pressure  No recent injury, denies numbness/tingling of upper extremities     He was recently started on blood pressure medication but he does not regularly take this as he checks his blood pressure at home and his SBP is usually on the lower side around 110          Objective    /85   Pulse 77   Temp 98.4  F (36.9  C) (Oral)   Resp 16   Wt 66.1 kg (145 lb 12.8 oz)   SpO2 98%   BMI 26.73 kg/m    Body mass index is 26.73 kg/m .  Physical Exam  Vitals reviewed.   Constitutional:       General: He is not in acute distress.     Appearance: Normal appearance. He is normal weight. He is not ill-appearing.   HENT:      Head: Normocephalic.   Cardiovascular:      Rate and Rhythm: Normal rate and regular rhythm.      Heart sounds: No murmur heard.  Pulmonary:      Effort: Pulmonary effort is normal. No respiratory distress.      Breath sounds: Normal breath sounds. No wheezing.   Skin:     General: Skin is warm and dry.   Neurological:      General: No focal deficit present.      Mental Status: He is alert.      Motor: No weakness.              Signed Electronically by: Kaela Perales DO

## 2024-11-14 ENCOUNTER — OFFICE VISIT (OUTPATIENT)
Dept: FAMILY MEDICINE | Facility: CLINIC | Age: 44
End: 2024-11-14
Payer: COMMERCIAL

## 2024-11-14 ENCOUNTER — TELEPHONE (OUTPATIENT)
Dept: CARE COORDINATION | Facility: CLINIC | Age: 44
End: 2024-11-14

## 2024-11-14 ENCOUNTER — HOSPITAL ENCOUNTER (OUTPATIENT)
Dept: MRI IMAGING | Facility: HOSPITAL | Age: 44
Discharge: HOME | End: 2024-11-14
Attending: FAMILY MEDICINE
Payer: COMMERCIAL

## 2024-11-14 VITALS
TEMPERATURE: 97.3 F | BODY MASS INDEX: 26.13 KG/M2 | RESPIRATION RATE: 16 BRPM | HEIGHT: 62 IN | HEART RATE: 79 BPM | WEIGHT: 142 LBS | SYSTOLIC BLOOD PRESSURE: 133 MMHG | OXYGEN SATURATION: 99 % | DIASTOLIC BLOOD PRESSURE: 86 MMHG

## 2024-11-14 DIAGNOSIS — G40.219 PARTIAL EPILEPSY, WITH IMPAIRMENT OF CONSCIOUSNESS, WITH INTRACTABLE EPILEPSY (H): ICD-10-CM

## 2024-11-14 DIAGNOSIS — G93.89 ENCEPHALOMALACIA ON IMAGING STUDY: ICD-10-CM

## 2024-11-14 DIAGNOSIS — R42 DIZZINESS: Primary | ICD-10-CM

## 2024-11-14 DIAGNOSIS — G40.909 SEIZURE DISORDER (H): ICD-10-CM

## 2024-11-14 LAB — TSH SERPL DL<=0.005 MIU/L-ACNC: 1 UIU/ML (ref 0.3–4.2)

## 2024-11-14 PROCEDURE — 80177 DRUG SCRN QUAN LEVETIRACETAM: CPT

## 2024-11-14 PROCEDURE — 36415 COLL VENOUS BLD VENIPUNCTURE: CPT

## 2024-11-14 PROCEDURE — 84443 ASSAY THYROID STIM HORMONE: CPT

## 2024-11-14 PROCEDURE — 99214 OFFICE O/P EST MOD 30 MIN: CPT | Mod: GC

## 2024-11-14 PROCEDURE — A9585 GADOBUTROL INJECTION: HCPCS | Performed by: FAMILY MEDICINE

## 2024-11-14 PROCEDURE — 255N000002 HC RX 255 OP 636: Performed by: FAMILY MEDICINE

## 2024-11-14 PROCEDURE — 70553 MRI BRAIN STEM W/O & W/DYE: CPT

## 2024-11-14 RX ORDER — MECLIZINE HYDROCHLORIDE 25 MG/1
25 TABLET ORAL 3 TIMES DAILY PRN
Qty: 60 TABLET | Refills: 1 | Status: SHIPPED | OUTPATIENT
Start: 2024-11-14

## 2024-11-14 RX ORDER — GADOBUTROL 604.72 MG/ML
7 INJECTION INTRAVENOUS ONCE
Status: COMPLETED | OUTPATIENT
Start: 2024-11-14 | End: 2024-11-14

## 2024-11-14 RX ADMIN — GADOBUTROL 7 ML: 604.72 INJECTION INTRAVENOUS at 07:38

## 2024-11-14 NOTE — PROGRESS NOTES
Assessment & Plan     Dizziness  Chief complaint today, present since breakthrough seizure (09/2024). Not improved with zofran, will prescribe meclizine for symptomatic support. Appreciate input from neurology team at upcoming visit as patient is concerned about his keppra medication causing these symptoms. Will check TSH to ensure not related to thyroid abnormalities and keppra level as below. Patient requested call from social work to discuss financial resources as he has not been working due to severity of dizziness.   - meclizine (ANTIVERT) 25 MG tablet  Dispense: 60 tablet; Refill: 1  - TSH with free T4 reflex  - Primary Care - Care Coordination Referral    Partial epilepsy, with impairment of consciousness, with intractable epilepsy (H)  Encephalomalacia on imaging study  Known seizure disorder, established with neurology. Current regimen includes Keppra 750 mg BID, breakthrough seizure on 9/17/24 without medication change. Keppra level at that time <5, inconsistent with adherence which he endorses. Reports last dose as this AM (7am), will collect keppra level now to evaluate bioavailability of medication. I recommend following up with neurology asap, currently scheduled for 1/3/2025. Concern that his symptoms today may be a side effect of medication or poorly controlled seizure disorder. Previous brain MRI in 2022 with encephalomalacia, reviewed follow up imaging performed earlier today without acute changes but evidence of previous chronic insults 2/2 seizure or subclinical TIA.   - Keppra (Levetiracetam) Level  - Keppra (Levetiracetam) Level  - Primary Care - Care Coordination Referral            Return in about 3 weeks (around 12/5/2024) for Follow up.        Subjective   Clara is a 44 year old, presenting for the following health issues:  Follow Up        11/14/2024    10:52 AM   Additional Questions   Roomed by gh   Accompanied by daughter         11/14/2024    Information     "services provided? No        HPI     Following up for dizziness and light headedness that started at time of breakthrough seizure (9/17) with known seizure disorder   Continues taking Keppra BID without difficulty   Tried to get in sooner with neurology for follow up, moved appt from 1/22 to 1/3, soonest available   Today, reports headaches have improved but dizziness and light headedness unchanged despite zofran administration   Went to ED for these complaints since last visit, normal head CT without treatment   Recommended medication from PCP for headaches, patient declines this today as headache is improved   No loss of consciousness or falls, has not been working, staying home to rest   Ongoing left arm numbness, tingling, and heaviness especially when pain is bad, recommend possible EMG with neuro at follow up        Objective    /86   Pulse 79   Temp 97.3  F (36.3  C) (Tympanic)   Resp 16   Ht 1.573 m (5' 1.93\")   Wt 64.4 kg (142 lb)   SpO2 99%   BMI 26.03 kg/m    Body mass index is 26.03 kg/m .  Physical Exam  Vitals reviewed.   Constitutional:       General: He is not in acute distress.     Appearance: Normal appearance.   HENT:      Head: Normocephalic.   Eyes:      Extraocular Movements: Extraocular movements intact.      Conjunctiva/sclera: Conjunctivae normal.      Pupils: Pupils are equal, round, and reactive to light.   Cardiovascular:      Rate and Rhythm: Normal rate and regular rhythm.      Heart sounds: No murmur heard.  Pulmonary:      Effort: Pulmonary effort is normal. No respiratory distress.   Musculoskeletal:      Right hand: Normal strength. Normal sensation.      Left hand: Normal strength. Normal sensation.      Cervical back: Neck supple.   Skin:     General: Skin is warm and dry.   Neurological:      General: No focal deficit present.      Mental Status: He is alert and oriented to person, place, and time. Mental status is at baseline.      Cranial Nerves: No cranial nerve " deficit, dysarthria or facial asymmetry.      Motor: No tremor.      Gait: Gait normal.           Signed Electronically by: Kaela Perales DO

## 2024-11-14 NOTE — PROGRESS NOTES
Preceptor Attestation:    I discussed the patient with the resident and evaluated the patient in person. I have verified the content of the note, which accurately reflects my assessment of the patient and the plan of care.   Supervising Physician:  Placido Giron MD.

## 2024-11-14 NOTE — TELEPHONE ENCOUNTER
Care Coordination: 11/14/2024    Dr. Perales presented this patient to care coordination to assist in applying for financial assistance.     Support Call: To the patient suggesting that he go directly to:    [facility]  [address]  [place] [address]  Phone: [phone]    I explained that he should bring identification, Financial Information, Rent/ Mortgage information for all that live in his house. I also provided:    [place] [facility] [employer]  [address]  [place] [address]  Phone: [phone]         Dru Carty Sr.   Care Coordination  [address] [address]  [email or web address]   Office: [phone] Direct: [phone]  [facility]

## 2024-11-15 LAB — LEVETIRACETAM SERPL-MCNC: 20.8 ÂΜG/ML (ref 10–40)

## 2024-11-21 ENCOUNTER — APPOINTMENT (OUTPATIENT)
Dept: INTERPRETER SERVICES | Facility: CLINIC | Age: 44
End: 2024-11-21
Payer: COMMERCIAL

## 2024-12-12 NOTE — RESULT ENCOUNTER NOTE
Hospitalist History and Physical   Admit Date:  2024  8:45 AM   Name:  Sobeida Ugarte   Age:  89 y.o.  Sex:  female  :  1930   MRN:  [id number]   Room:      Presenting/Chief Complaint: Fall     Reason(s) for Admission: AMS (altered mental status) [R41.82]     History of Present Illness:   Sobeida Ugarte is a 89 y.o. female with medical history of chronic lung disease/interstitial lung disease, bronchiectasis, GERD, hypertension, CKD, hyperlipidemia, coronary artery disease, memory loss, vitamin D deficiency, osteoarthritis who presents today after being found down by her spouse at home.  Daughter is bedside who provided most of the history.    Of note, patient was recently seen in pulmonology clinic on 10/25.  At that time was complaining of shortness of breath and chronic cough.  Chest x-ray and CT scan showed traction bronchiectasis with significant infiltrates bilaterally along with honeycombing.  Being worked up for chronic infection such as UIP/NSIP and considering BAL.  Based on discussions with daughter, this was likely to not be pursued given patient's DNR/DNI status and concern for prolonged antibiotic/side effects.  Family was considering hospice care around this time.    Patient recently saw PCP on , and was having baseline cough/shortness of breath.  Yesterday afternoon, daughter got a call from the patient complaining of increased weakness.  Was found down this morning by  and daughter states she was altered and unable to walk when she arrived at her home.    Patient taken to ER, where chest x-ray showed interval development of bilateral airspace opacities.  Patient tachypneic in the 20s, 30s.  Lactic acid 5.8.  Mild hyperkalemia 5.3 and leukocytosis 16.2.    On my evaluation, patient was denying any shortness of breath.  Spoke to the daughter at bedside who states that she is concerned about patient's functional decline and interested in pursuing  "[facility]     Can you call this patient?     \"The labs overall look good. You don't have HIV or Hepatitis C. Your liver function looks good.  Stop taking ibuprofen because your creatinine, a lab we look at for your kidney, shows some indication shows that kidney is a little damaged. Please stop taking the ibuprofen.   You should hear from neurology clinic in about 3-5 days. If you don't in this time period, give our clinic a call and we will help you connect with them    Let us know if you have any questions\"       Thank you, Preston!   -Dr. Galan" hospice.      Assessment & Plan:     Altered mental status  We will send basic workup but concerned this is related to underlying lung disease.  Patient is likely appropriate for hospice but will rule out reversible causes of her presentation  -Check TSH, RPR, B12, folate  -Ceftriaxone azithromycin for CAP    Bilateral lung opacities  -Being worked up for UIP/NSIP although family and likely not pursuing BAL  -Ceftriaxone/azithromycin for CAP  -Continue home Tessalon Perles  -As needed albuterol    Falls  Lactic acidosis  Suspect leukocytosis reactive due to fall  -PT/OT  -IV maintenance fluids 100 cc an hour for 24-hour  -Trend lactic acid  -Check current care    Hyperkalemia, mild  -IV fluids as above  -Hold ACE  -Trend potassium    Hyponatremia  -IV fluids as above  -Trend sodium    Coronary artery disease  -Continue aspirin and Zetia    GERD  -Continue famotidine and omeprazole    Allergies  -Continue cetirizine    Hypertension  -Holding lisinopril    PT/OT evals ordered?  Therapy evals ordered  Diet: ADULT DIET; Regular  VTE prophylaxis: Lovenox  Code status: DNR      Non-peripheral Lines and Tubes (if present):             Hospital Problems:  Principal Problem:    AMS (altered mental status)  Resolved Problems:    * No resolved hospital problems. *        Objective:   Patient Vitals for the past 24 hrs:   Temp Pulse Resp BP SpO2   12/12/24 1102 -- 91 28 129/81 --   12/12/24 1047 -- 86 (!) 37 114/65 --   12/12/24 1032 -- 89 (!) 37 103/75 --   12/12/24 1018 -- 93 30 130/77 93 %   12/12/24 1003 -- -- -- -- 92 %   12/12/24 1003 -- 91 27 110/80 94 %   12/12/24 1000 -- -- -- -- 93 %   12/12/24 0948 -- 99 23 126/85 --   12/12/24 0933 -- (!) 105 28 114/79 --   12/12/24 0918 -- (!) 103 24 124/88 --   12/12/24 0903 -- (!) 106 29 121/78 --   12/12/24 0851 98 °F (36.7 °C) (!) 108 26 105/80 93 %   12/12/24 0848 -- -- -- 105/80 --       Oxygen Therapy  SpO2: 93 %  O2 Device: Nasal cannula  O2 Flow Rate (L/min): 3  6.3 - 8.2 g/dL    Albumin 2.9 (L) 3.2 - 4.6 g/dL    Globulin 5.0 (H) 2.3 - 3.5 g/dL    Albumin/Globulin Ratio 0.6 (L) 1.0 - 1.9     Urinalysis    Collection Time: 12/12/24  9:19 AM   Result Value Ref Range    Color, UA DARK YELLOW      Appearance CLEAR      Specific Gravity, UA 1.016 1.001 - 1.023      pH, Urine 6.0 5.0 - 9.0      Protein, UA 30 (A) NEG mg/dL    Glucose, Ur Negative NEG mg/dL    Ketones, Urine TRACE (A) NEG mg/dL    Bilirubin, Urine Negative NEG      Blood, Urine Negative NEG      Urobilinogen, Urine 0.2 0.2 - 1.0 EU/dL    Nitrite, Urine Negative NEG      Leukocyte Esterase, Urine Negative NEG      WBC, UA 0-4 U4 /hpf    RBC, UA 0-5 U5 /hpf    Epithelial Cells, UA 0-5 U5 /hpf    BACTERIA, URINE Negative NEG /hpf    Hyaline Casts, UA 2-5 /lpf   Lactate, Sepsis    Collection Time: 12/12/24  9:19 AM   Result Value Ref Range    Lactic Acid, Sepsis 5.8 (HH) 0.5 - 2.0 MMOL/L   Lactate, Sepsis    Collection Time: 12/12/24 11:58 AM   Result Value Ref Range    Lactic Acid, Sepsis 3.9 (HH) 0.5 - 2.0 MMOL/L       No results for input(s): \"COVID19\" in the last 72 hours.    XR PELVIS (1-2 VIEWS)    Result Date: 12/12/2024  EXAM: PELVIS RADIOGRAPH CLINICAL INDICATION/HISTORY: fall COMPARISON: 22 May 2022 TECHNIQUE:  AP  view of the pelvis _______________     Findings/impression: Chronic fractures of the right pubic rami. No definite acute finding on this limited portable exam. Degenerative changes of the hips. If clinical concern remains particularly if the patient is unable to bear weight CT hip/pelvis may be useful. Electronically signed by Genaro Bryant    XR CHEST PORTABLE    Result Date: 12/12/2024  Chest X-ray INDICATION: cough COMPARISON: 16 September 24, 20 August 24 TECHNIQUE: AP view of the chest was obtained.     Findings/impression: Utilizing the above-mentioned prior exams as a baseline interval development of ill-defined airspace opacities predominantly of the perihilar regions and left lower lung.

## 2025-01-06 ENCOUNTER — OFFICE VISIT (OUTPATIENT)
Dept: FAMILY MEDICINE | Facility: CLINIC | Age: 45
End: 2025-01-06
Payer: COMMERCIAL

## 2025-01-06 VITALS
HEART RATE: 77 BPM | DIASTOLIC BLOOD PRESSURE: 90 MMHG | RESPIRATION RATE: 20 BRPM | TEMPERATURE: 97.7 F | OXYGEN SATURATION: 100 % | HEIGHT: 62 IN | BODY MASS INDEX: 25.88 KG/M2 | WEIGHT: 140.6 LBS | SYSTOLIC BLOOD PRESSURE: 136 MMHG

## 2025-01-06 DIAGNOSIS — R42 DIZZINESS: ICD-10-CM

## 2025-01-06 DIAGNOSIS — I10 ESSENTIAL HYPERTENSION: ICD-10-CM

## 2025-01-06 DIAGNOSIS — Z00.00 HEALTHCARE MAINTENANCE: ICD-10-CM

## 2025-01-06 DIAGNOSIS — G40.219 PARTIAL EPILEPSY, WITH IMPAIRMENT OF CONSCIOUSNESS, WITH INTRACTABLE EPILEPSY (H): Primary | ICD-10-CM

## 2025-01-06 RX ORDER — OLMESARTAN MEDOXOMIL 20 MG/1
10 TABLET ORAL DAILY
Qty: 45 TABLET | Refills: 2 | Status: SHIPPED | OUTPATIENT
Start: 2025-01-06

## 2025-01-06 NOTE — PROGRESS NOTES
Assessment & Plan     Partial epilepsy, with impairment of consciousness, with intractable epilepsy (H)  Dizziness  44 year old male with known seizure disorder, who follows with neurology, experiencing residual symptoms after breakthrough seizure (09/2024). Daily debilitating nausea with right arm squeezing sensation that is stable in frequency but decreased in length of episodes. Continues to use meclizine and muscle relaxer for symptomatic relief. Saw neurology on 1/3 and opted for ambulatory EEG monitoring, follow up with them on 1/28/25. Continue to offer and recommend PT/OT for arm symptoms, patient continues to decline. Recommend he do home exercises over the next month and report back on improvement at follow up visit.     Essential hypertension  Poorly controlled. Patient reports he self discontinued medication after the first week of therapy because he thought that was all he needed. Denies HA or vision changes today. Will restart medication and follow up in 1 month to recheck.   - olmesartan (BENICAR) 20 MG tablet  Dispense: 45 tablet; Refill: 2    Healthcare maintenance  - TDAP 10-64Y (ADACEL,BOOSTRIX)          Return in about 4 weeks (around 2/3/2025) for Follow up.        Subjective   Ler is a 44 year old, presenting for the following health issues:  Follow Up        1/6/2025     3:05 PM   Additional Questions   Roomed by gh   Accompanied by daughter         1/6/2025    Information    services provided? Yes   Susana Draper   Type of interpretation provided Face-to-face    name adriane adam    Agency [employer]     Following up for dizziness and light headedness that started at time of breakthrough seizure (9/17) with known seizure disorder   Continues taking Keppra BID without difficulty (confirmed therapeutic serum levels on 11/14)   Saw neurology 3 days prior (1/3/25) and opted to move forward with ambulatory EEG instead of change to keppra dose  "  Will follow up with primary neurologist Dr. Sultana on 1/22/2025     Today, he reports he continues to experience squeezing tight pain of left arm and dizziness  Symptoms occur at least daily and last 10 minutes on average (previously lasted 30 mins on average)  He is most scared when it happens that a seizure will follow as this has happened before   Meclizine is still helpful for dizziness and muscle relaxer for arm pain     Patient and family report that he will return to neurology on 1/28 for ambulatory EEG placement and return for follow up     States that he discontinued antihypertensive therapy on his own as he thought he had completed the therapy. Stopped taking it after 1 week.       Objective    BP (!) 136/90   Pulse 77   Temp 97.7  F (36.5  C) (Tympanic)   Resp 20   Ht 1.573 m (5' 1.93\")   Wt 63.8 kg (140 lb 9.6 oz)   SpO2 100%   BMI 25.78 kg/m    Body mass index is 25.78 kg/m .  Physical Exam  Vitals reviewed.   Constitutional:       General: He is not in acute distress.     Appearance: Normal appearance. He is not ill-appearing.   HENT:      Head: Normocephalic.   Cardiovascular:      Rate and Rhythm: Normal rate and regular rhythm.      Pulses: Normal pulses.      Heart sounds: Normal heart sounds. No murmur heard.  Pulmonary:      Effort: Pulmonary effort is normal. No respiratory distress.   Skin:     General: Skin is warm and dry.   Neurological:      General: No focal deficit present.      Mental Status: He is alert.      Comments: Hand  strength intact bilaterally.               Signed Electronically by: Kaela Perales DO    "

## 2025-01-22 ENCOUNTER — OFFICE VISIT (OUTPATIENT)
Dept: NEUROLOGY | Facility: CLINIC | Age: 45
End: 2025-01-22
Payer: COMMERCIAL

## 2025-01-22 VITALS
TEMPERATURE: 98.1 F | DIASTOLIC BLOOD PRESSURE: 84 MMHG | HEART RATE: 79 BPM | SYSTOLIC BLOOD PRESSURE: 123 MMHG | OXYGEN SATURATION: 99 %

## 2025-01-22 DIAGNOSIS — G40.909 SEIZURE DISORDER (H): ICD-10-CM

## 2025-01-22 ASSESSMENT — PAIN SCALES - GENERAL: PAINLEVEL_OUTOF10: MODERATE PAIN (5)

## 2025-01-22 NOTE — LETTER
2025       RE: Clara Escobar  : 1980   MRN: [id number]      Dear Colleague,    Thank you for referring your patient, Clara Escobar, to the [facility] EPILEPSY [facility] at [facility]. Please see a copy of my visit note below.      [facility]:  RETURN VISIT           Service Date: 2025     HISTORY:  Mr. Clara Escobar is a 44-year-old man who was referred for evaluation of convulsive seizures.  The patient came alone to the visit and was able to provide some medical history through a digital remote RooT .  I reviewed medical records on the [facility] chart.    Since the last visit on 2023, he has had no seizures.    He denied missed medication doses and denied adverse effects.      He has had intermittent dizziness that cannot be further described through interpreters.  He stated that he hs felt this feeling before seizures.  Based on this, an out-patient VEEG has been scheduled for later in January.     Ictal semiology-history:   The patient reported that he has had 1 type of seizure, which began in 2017.  He thinks he has had 5 seizures in total spread out over about 5 years.  His most recent seizure occurred on 2022, at which time he had Emergency Department evaluation in a [facility] facility.  The patient stated that family members have observed his seizures and reported the abnormal movements to him.  These always begin when he notices left arm involuntary movements with large twisting or flailing movements for a few seconds, followed by loss of consciousness.  Afterwards, he always notes lethargy and confusion for an hour or two, or he immediately goes to sleep if permitted.  He reported that family members see him fall down and have shaking of his whole body while he is unconscious.  He may have had blood in his mouth or urinary incontinence when he regains awareness, but he was uncertain as to how often  "this has happened.     The patient denied that he has ever had a generalized convulsion, paroxysmal episode of unresponsive staring without falling, non-convulsive falling with unconsciousness, repetitive involuntary movements or posturing, sudden brief confusion, or other paroxysmal phenomena.  He denied any history of convulsive status epilepticus, or complex partial status epilepticus.  He denied any history of sudden involuntary jerks while fully awake, but he has had hypnic jerks.       He does think that some seizures may have been induced by eating \"the wrong food\", but he could not further specify this.     Epilepsy-seizure predispositions:   The patient has no family history of epilepsy or seizures.  He has no history of gestational or  injury, febrile convulsions, developmental delay, stroke, meningitis, encephalitis, significant head injury, or other epileptic predispositions.       Laboratory evaluations:   The patient initially [place] in  and lived in [place].  He thinks that his first seizure occurred in [place] and that he had a CT scan and an electroencephalogram, but he does not know the results of these tests.  The medical record shows electroencephalograms and MRIs scanning ordered in the [employer] system, but it appears that these were not completed.    A brain MRI was completed at [facility], and showed  a high right frontal lobe and right temporal lobe white matter change with associated encephalomalacia and enhancement,  indeterminate but consistent with an acute on chronic white matter process.     An EEG at [facility] was normal with no pathological slowing, no interictal epileptiform abnormalities, no electrographic seizures and no paroxysmal behavioral events were recorded during the period of monitoring.      Epilepsy therapeutics:   The patient was started on levetiracetam in [place], which he took for several years, without side effects.  He has " "not been on other antiseizure medications.  After moving to [place], levetiracetam was restarted in 2022.     PAST MEDICAL HISTORY:    1.  Chronic epilepsy with generalized tonic-clonic seizures.  2.  History of systemic hypertension.     FAMILY HISTORY:  There is no family history of seizures or epilepsy, or of other neurological conditions.       PERSONAL AND SOCIAL HISTORY:  The patient grew up in [place]  He attended school for \"a few years\".  [place] moved [place] the [place] in [place], then moved from [place] to [place].  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  He lives with his wife and their 4 children, also with his parents in the home.  He denied using tobacco, illicit recreational drugs or ethanol, but he uses substances that increase his alertness including caffeine-containing beverages, energy drinks and betel nut juice.     REVIEW OF SYSTEMS:  The patient denied history of memory disturbance, weakness, tremors or other abnormal involuntary movements, numbness or tingling, incoordination, falling or difficulty in walking, urinary or fecal incontinence, headache and other pain, except as described above.  The patient denied any history of severe depression or suicidal ideation, or severe anxiety.  He denied rashes and easy bruising.  The remainder of a 14-system symptom review was negative.     MEDICATIONS:  Levetiracetam 750 mg b.i.d.     PHYSICAL EXAMINATION:    On examination the patient was an alert man in no apparent distress.  Pulse 63.  Blood pressure 138/91 (but 132/82 on recheck).  Respirations 16 per minute.  Skull was normocephalic and atraumatic.  Neck was supple.       On neurological examination, the patient appeared alert and was fully oriented to person, place, time, and reason for visit.  Speech was reported to be in the [place] language.  Cranial nerves III through XII were grossly normal.  Muscle masses, tones and strengths were normal throughout.  " There was no pronator drift.  No spontaneous tremors, myoclonus, or other abnormal movements were observed.  Sensations of light touch were reportedly normal throughout.  The finger-nose-finger maneuvers were performed normally bilaterally.  Romberg maneuver was negative.  Regular, heel, toe, tandem and reverse tandem walking were normal.  Deep tendon reflexes were normal and symmetric throughout.  Toes were downgoing bilaterally.       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  The etiology of this is unclear from his history, but a brain MRI showed right frontotmeporal encephalopmalci sites consitnet with infarctions or other remote injuries.  An outpatient VEEG was  normal.    Since the last visit on 02/28/2023, he has had no seizures.  He denied missed medication doses and denied adverse effects.      He has had intermittent dizziness that cannot be further described through interpreters.  He stated that he hs felt this feeling before seizures.  Based on this, an out-patient VEEG has been scheduled for later in January.     We discussed [place] driving laws in detail.  He appeared to clearly understand that he is prohibited from operating a motor vehicle within 3 months following any seizure or other episode with sudden unconsciousness or inability to sit up, and that he is required to report any future such seizure to the [facility] within 30 days after the event.       PLAN:    1.  Continue levetiracetam at the current dose.  2.  Out-patient VEEG.    3.  Return visit in approximately 3 months.     I spent 31 minutes in this patient care, with 21 minutes in direct patient contact, and 11 minutes in chart review and document preparation.         Brandon Sultana M.D.   Professor of Neurology       Again, thank you for allowing me to participate in the care of your  patient.      Sincerely,    Brandon Sultana MD

## 2025-01-22 NOTE — PROGRESS NOTES
[facility]psy [facility]:  RETURN VISIT           Service Date: 01/22/2025     HISTORY:  Mr. Clara Escobar is a 44-year-old man who was referred for evaluation of convulsive seizures.  The patient came alone to the visit and was able to provide some medical history through a digital remote [place] .  I reviewed medical records on the [facility] chart.    Since the last visit on 02/28/2023, he has had no seizures.    He denied missed medication doses and denied adverse effects.      He has had intermittent dizziness that cannot be further described through interpreters.  He stated that he hs felt this feeling before seizures.  Based on this, an out-patient VEEG has been scheduled for later in January.     Ictal semiology-history:   The patient reported that he has had 1 type of seizure, which began in 2017.  He thinks he has had 5 seizures in total spread out over about 5 years.  His most recent seizure occurred on 09/24/2022, at which time he had Emergency Department evaluation in a [facility].  The patient stated that family members have observed his seizures and reported the abnormal movements to him.  These always begin when he notices left arm involuntary movements with large twisting or flailing movements for a few seconds, followed by loss of consciousness.  Afterwards, he always notes lethargy and confusion for an hour or two, or he immediately goes to sleep if permitted.  He reported that family members see him fall down and have shaking of his whole body while he is unconscious.  He may have had blood in his mouth or urinary incontinence when he regains awareness, but he was uncertain as to how often this has happened.     The patient denied that he has ever had a generalized convulsion, paroxysmal episode of unresponsive staring without falling, non-convulsive falling with unconsciousness, repetitive involuntary movements or posturing, sudden brief confusion, or other  "paroxysmal phenomena.  He denied any history of convulsive status epilepticus, or complex partial status epilepticus.  He denied any history of sudden involuntary jerks while fully awake, but he has had hypnic jerks.       He does think that some seizures may have been induced by eating \"the wrong food\", but he could not further specify this.     Epilepsy-seizure predispositions:   The patient has no family history of epilepsy or seizures.  He has no history of gestational or  injury, febrile convulsions, developmental delay, stroke, meningitis, encephalitis, significant head injury, or other epileptic predispositions.       Laboratory evaluations:   [place] in  and lived in [facility].  He thinks that his first seizure occurred in [place] and that he had a CT scan and an electroencephalogram, but he does not know the results of these tests.  The medical record shows electroencephalograms and MRIs scanning ordered in the [facility] system, but it appears that these were not completed.    A brain MRI was completed at [facility], and showed  a high right frontal lobe and right temporal lobe white matter change with associated encephalomalacia and enhancement,  indeterminate but consistent with an acute on chronic white matter process.     An EEG at [facility] was normal with no pathological slowing, no interictal epileptiform abnormalities, no electrographic seizures and no paroxysmal behavioral events were recorded during the period of monitoring.      Epilepsy therapeutics:   The patient was started on levetiracetam in [place], which he took for several years, without side effects.  He has not been on other antiseizure medications.  After moving to [place], levetiracetam was restarted in .     PAST MEDICAL HISTORY:    1.  Chronic epilepsy with generalized tonic-clonic seizures.  2.  History of systemic hypertension.     FAMILY HISTORY:  There is no " "family history of seizures or epilepsy, or of other neurological conditions.       PERSONAL AND SOCIAL HISTORY:  The patient grew up in [place]  He attended school for \"a few years\".  [place] moved [place] the [place] in [place], then moved from [place] to [place].  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  He lives with his wife and their 4 children, also with his parents in the home.  He denied using tobacco, illicit recreational drugs or ethanol, but he uses substances that increase his alertness including caffeine-containing beverages, energy drinks and betel nut juice.     REVIEW OF SYSTEMS:  The patient denied history of memory disturbance, weakness, tremors or other abnormal involuntary movements, numbness or tingling, incoordination, falling or difficulty in walking, urinary or fecal incontinence, headache and other pain, except as described above.  The patient denied any history of severe depression or suicidal ideation, or severe anxiety.  He denied rashes and easy bruising.  The remainder of a 14-system symptom review was negative.     MEDICATIONS:  Levetiracetam 750 mg b.i.d.     PHYSICAL EXAMINATION:    On examination the patient was an alert man in no apparent distress.  Pulse 63.  Blood pressure 138/91 (but 132/82 on recheck).  Respirations 16 per minute.  Skull was normocephalic and atraumatic.  Neck was supple.       On neurological examination, the patient appeared alert and was fully oriented to person, place, time, and reason for visit.  Speech was reported to be in the [place] language.  Cranial nerves III through XII were grossly normal.  Muscle masses, tones and strengths were normal throughout.  There was no pronator drift.  No spontaneous tremors, myoclonus, or other abnormal movements were observed.  Sensations of light touch were reportedly normal throughout.  The finger-nose-finger maneuvers were performed normally bilaterally.  Romberg maneuver was " negative.  Regular, heel, toe, tandem and reverse tandem walking were normal.  Deep tendon reflexes were normal and symmetric throughout.  Toes were downgoing bilaterally.       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  The etiology of this is unclear from his history, but a brain MRI showed right frontotmeporal encephalopmalci sites consitnet with infarctions or other remote injuries.  An outpatient VEEG was  normal.    Since the last visit on 02/28/2023, he has had no seizures.  He denied missed medication doses and denied adverse effects.      He has had intermittent dizziness that cannot be further described through interpreters.  He stated that he hs felt this feeling before seizures.  Based on this, an out-patient VEEG has been scheduled for later in January.     We discussed [place] driving laws in detail.  He appeared to clearly understand that he is prohibited from operating a motor vehicle within 3 months following any seizure or other episode with sudden unconsciousness or inability to sit up, and that he is required to report any future such seizure to the [facility] within 30 days after the event.       PLAN:    1.  Continue levetiracetam at the current dose.  2.  Out-patient VEEG.    3.  Return visit in approximately 3 months.     I spent 31 minutes in this patient care, with 21 minutes in direct patient contact, and 11 minutes in chart review and document preparation.         Brandon Sultana M.D.   Professor of Neurology

## 2025-02-13 ENCOUNTER — OFFICE VISIT (OUTPATIENT)
Dept: FAMILY MEDICINE | Facility: CLINIC | Age: 45
End: 2025-02-13
Payer: COMMERCIAL

## 2025-02-13 VITALS
HEART RATE: 65 BPM | OXYGEN SATURATION: 99 % | DIASTOLIC BLOOD PRESSURE: 88 MMHG | BODY MASS INDEX: 26.91 KG/M2 | WEIGHT: 146.2 LBS | HEIGHT: 62 IN | RESPIRATION RATE: 20 BRPM | SYSTOLIC BLOOD PRESSURE: 136 MMHG | TEMPERATURE: 97.7 F

## 2025-02-13 DIAGNOSIS — I10 ESSENTIAL HYPERTENSION: Primary | ICD-10-CM

## 2025-02-13 NOTE — PROGRESS NOTES
"  Assessment & Plan     Essential hypertension  Well controlled. Continue on monotherapy with olmesartan 10 mg daily. F/U after next neurology visit.   - Basic metabolic panel        Ila Elizabeth is a 44 year old, presenting for the following health issues:  Follow Up (BP AND EEG RESULTS )        2/13/2025     3:13 PM   Additional Questions   Roomed by skip   Accompanied by sandip         2/13/2025    Information    services provided? Yes   Susana Draper   Type of interpretation provided Face-to-face    name adriane adam    Agency [employer]       Clara Escobar is a 44 year old male with a history of known seizure disorder who is following up today for HTN   Seen 1 month ago, BP poorly controlled, patient self-discontinued previous meds, restarted on olmesartan 10 mg (1/2 tablet) daily     Today, patient reports no difficulties taking medication or adverse reactions to medication   He is splitting his pills in half at home, no issues with this   Denies headache or vision changes today     Patient also asking to review video EEG results from neurology, appears normal per my read        Objective    /88   Pulse 65   Temp 97.7  F (36.5  C) (Tympanic)   Resp 20   Ht 1.573 m (5' 1.93\")   Wt 66.3 kg (146 lb 3.2 oz)   SpO2 99%   BMI 26.80 kg/m    Body mass index is 26.8 kg/m .  Physical Exam  Vitals reviewed.   Constitutional:       General: He is not in acute distress.     Appearance: Normal appearance.   HENT:      Head: Normocephalic.   Cardiovascular:      Rate and Rhythm: Normal rate and regular rhythm.      Heart sounds: No murmur heard.  Pulmonary:      Effort: Pulmonary effort is normal. No respiratory distress.   Skin:     General: Skin is warm and dry.   Neurological:      Mental Status: He is alert.           Signed Electronically by: Kaela Perales DO    "

## 2025-02-13 NOTE — PROGRESS NOTES
"Preceptor attestation:  Vital signs reviewed: /88   Pulse 65   Temp 97.7  F (36.5  C) (Tympanic)   Resp 20   Ht 1.573 m (5' 1.93\")   Wt 66.3 kg (146 lb 3.2 oz)   SpO2 99%   BMI 26.80 kg/m      Patient seen, evaluated, and discussed with the resident.  I verified the content of the note, which accurately reflects my assessment of the patient and the plan of care.    Supervising physician: Vani Ashton MD  [facility]      "

## 2025-02-15 ENCOUNTER — HEALTH MAINTENANCE LETTER (OUTPATIENT)
Age: 45
End: 2025-02-15

## 2025-04-02 ENCOUNTER — OFFICE VISIT (OUTPATIENT)
Dept: NEUROLOGY | Facility: CLINIC | Age: 45
End: 2025-04-02
Payer: COMMERCIAL

## 2025-04-02 VITALS
OXYGEN SATURATION: 100 % | BODY MASS INDEX: 26.54 KG/M2 | WEIGHT: 144.2 LBS | HEIGHT: 62 IN | SYSTOLIC BLOOD PRESSURE: 139 MMHG | HEART RATE: 76 BPM | TEMPERATURE: 98 F | DIASTOLIC BLOOD PRESSURE: 86 MMHG

## 2025-04-02 DIAGNOSIS — G40.909 SEIZURE DISORDER (H): ICD-10-CM

## 2025-04-02 RX ORDER — LEVETIRACETAM 750 MG/1
750 TABLET ORAL 2 TIMES DAILY
Qty: 180 TABLET | Refills: 3 | Status: SHIPPED | OUTPATIENT
Start: 2025-04-02

## 2025-04-02 NOTE — PROGRESS NOTES
[facility] Epilepsy Clinic:  RETURN VISIT           Service Date: 04/02/2025     HISTORY:  Mr. Clara Escobar is a 44-year-old man who was referred for evaluation of convulsive seizures.  The patient came with his daughter to the visit today, and she was able to translate and provide some medical history.      Since the last visit on 01/22/2025, he has had no seizures.  The most recent seizure occurred on 09/24/2022.       He denied missed medication doses and denied adverse effects.       Ictal semiology-history:   The patient reported that he has had 1 type of seizure, which began in 2017.  He thinks he has had 5 seizures in total spread out over about 5 years, through a seizure on 09/24/2022, at which time he had Emergency Department evaluation in a [facility].  The patient stated that family members have observed his seizures and reported the abnormal movements to him.  These always begin when he notices left arm involuntary movements with large twisting or flailing movements for a few seconds, followed by loss of consciousness.  Afterwards, he always notes lethargy and confusion for an hour or two, or he immediately goes to sleep if permitted.  He reported that family members see him fall down and have shaking of his whole body while he is unconscious.  He may have had blood in his mouth or urinary incontinence when he regains awareness, but he was uncertain as to how often this has happened.     The patient denied that he has ever had a generalized convulsion, paroxysmal episode of unresponsive staring without falling, non-convulsive falling with unconsciousness, repetitive involuntary movements or posturing, sudden brief confusion, or other paroxysmal phenomena.  He denied any history of convulsive status epilepticus, or complex partial status epilepticus.  He denied any history of sudden involuntary jerks while fully awake, but he has had hypnic jerks.       He does think that some seizures may have  "been induced by eating \"the wrong food\", but he could not further specify this.     Epilepsy-seizure predispositions:   The patient has no family history of epilepsy or seizures.  He has no history of gestational or  injury, febrile convulsions, developmental delay, stroke, meningitis, encephalitis, significant head injury, or other epileptic predispositions.       Laboratory evaluations:   The patient [place] in  and lived in [facility].  He thinks that his first seizure occurred in [place] and that he had a CT scan and an electroencephalogram, but he does not know the results of these tests.  The medical record shows electroencephalograms and MRIs scanning ordered in the [facility] system, but it appears that these were not completed.     A brain MRI was completed at [facility], and showed  a high right frontal lobe and right temporal lobe white matter change with associated encephalomalacia and enhancement,  indeterminate but consistent with an acute on chronic white matter process.      A 2-hour EEG at [facility] was normal, on 2022, with no pathological slowing, no interictal epileptiform abnormalities, no electrographic seizures and no paroxysmal behavioral events were recorded during the period of monitoring.     A 21-hour ambulatory EEG at [facility] was normal, on 2025, with no pathological slowing, no interictal epileptiform abnormalities, no electrographic seizures and no paroxysmal behavioral events were recorded during the period of monitoring.      Epilepsy therapeutics:   The patient was started on levetiracetam in [place], which he took for several years, without side effects.  He has not been on other antiseizure medications.  After moving to [place], levetiracetam was restarted in .     PAST MEDICAL HISTORY:    1.  Chronic epilepsy with generalized tonic-clonic seizures.  2.  History of systemic hypertension.     FAMILY HISTORY:  There is no " "family history of seizures or epilepsy, or of other neurological conditions.       PERSONAL AND SOCIAL HISTORY:  The patient grew up in [place]  He attended school for \"a few years\".  [place] moved [place] in [place], then moved from [place] to [place].  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  He lives with his wife and their 4 children, also with his parents in the home.  He denied using tobacco, illicit recreational drugs or ethanol, but he uses substances that increase his alertness including caffeine-containing beverages, energy drinks and betel nut juice.     REVIEW OF SYSTEMS:  The patient denied history of memory disturbance, weakness, tremors or other abnormal involuntary movements, numbness or tingling, incoordination, falling or difficulty in walking, urinary or fecal incontinence, headache and other pain, except as described above.  The patient denied any history of severe depression or suicidal ideation, or severe anxiety.  He denied rashes and easy bruising.  The remainder of a 14-system symptom review was negative.     MEDICATIONS:  Levetiracetam 750 mg b.i.d.     PHYSICAL EXAMINATION:    On examination the patient was an alert man in no apparent distress.  Vital signs were as per the electronic medical record  Skull was normocephalic and atraumatic.  Neck was supple.       On neurological examination, the patient appeared alert and was fully oriented to person, place, time, and reason for visit.  Speech was reported to be normal in the [place] language.  Cranial nerves III through XII were grossly normal.  Muscle masses, tones and strengths were normal throughout.  There was no pronator drift.  No spontaneous tremors, myoclonus, or other abnormal movements were observed.  Sensations of light touch were reportedly normal throughout.  The finger-nose-finger maneuvers were performed normally bilaterally.  Romberg maneuver was negative.  Regular, heel, toe, " tandem and reverse tandem walking were normal.  Deep tendon reflexes were normal and symmetric throughout.  Toes were downgoing bilaterally.       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  The etiology of this is unclear from his history, but a brain MRI showed right frontotemporal encephalomalacic sites consistent with infarctions or other remote injuries.  An outpatient VEEG was normal.     He has had no reported seizures, since 2022.  He denied missed medication doses and denied adverse effects.      We spent some time discussing the mot recent EEG results, and reasons that seizures might recur if he stopped using levetiracetam,.       We discussed [place] driving laws.  He appeared to clearly understand that he is prohibited from operating a motor vehicle within 3 months following any seizure or other episode with sudden unconsciousness or inability to sit up, and that he is required to report any future such seizure to the [facility] within 30 days after the event.       PLAN:    1.  Continue levetiracetam at the current dose.  2.  Return visit in approximately 6 months.     I spent 34 minutes in this patient care, with 23 minutes in direct patient contact, and 11 minutes in chart review and document preparation.         Brandon Sultana M.D.   Professor of Neurology

## 2025-04-02 NOTE — LETTER
2025       RE: Clara Escobar  : 1980   MRN: [id number]      Dear Colleague,    Thank you for referring your patient, Clara Escobar, to the [facility] EPILEPSY [facility] at [facility]. Please see a copy of my visit note below.      [facility]:  RETURN VISIT           Service Date: 2025     HISTORY:  Mr. Clara Escobar is a 44-year-old man who was referred for evaluation of convulsive seizures.  The patient came with his daughter to the visit today, and she was able to translate and provide some medical history.      Since the last visit on 2025, he has had no seizures.  The most recent seizure occurred on 2022.       He denied missed medication doses and denied adverse effects.       Ictal semiology-history:   The patient reported that he has had 1 type of seizure, which began in 2017.  He thinks he has had 5 seizures in total spread out over about 5 years, through a seizure on 2022, at which time he [facility].  The patient stated that family members have observed his seizures and reported the abnormal movements to him.  These always begin when he notices left arm involuntary movements with large twisting or flailing movements for a few seconds, followed by loss of consciousness.  Afterwards, he always notes lethargy and confusion for an hour or two, or he immediately goes to sleep if permitted.  He reported that family members see him fall down and have shaking of his whole body while he is unconscious.  He may have had blood in his mouth or urinary incontinence when he regains awareness, but he was uncertain as to how often this has happened.     The patient denied that he has ever had a generalized convulsion, paroxysmal episode of unresponsive staring without falling, non-convulsive falling with unconsciousness, repetitive involuntary movements or posturing, sudden brief  "confusion, or other paroxysmal phenomena.  He denied any history of convulsive status epilepticus, or complex partial status epilepticus.  He denied any history of sudden involuntary jerks while fully awake, but he has had hypnic jerks.       He does think that some seizures may have been induced by eating \"the wrong food\", but he could not further specify this.     Epilepsy-seizure predispositions:   The patient has no family history of epilepsy or seizures.  He has no history of gestational or  injury, febrile convulsions, developmental delay, stroke, meningitis, encephalitis, significant head injury, or other epileptic predispositions.       Laboratory evaluations:   The patient initially [place] the [place] in  and lived in [place].  He thinks that his first seizure occurred in [place] and that he had a CT scan and an electroencephalogram, but he does not know the results of these tests.  The medical record shows electroencephalograms and MRIs scanning ordered in the [facility] system, but it appears that these were not completed.     A brain MRI was completed at [facility], and showed  a high right frontal lobe and right temporal lobe white matter change with associated encephalomalacia and enhancement,  indeterminate but consistent with an acute on chronic white matter process.      A 2-hour EEG at [facility] was normal, on 2022, with no pathological slowing, no interictal epileptiform abnormalities, no electrographic seizures and no paroxysmal behavioral events were recorded during the period of monitoring.     A 21-hour ambulatory EEG at [facility] was normal, on 2025, with no pathological slowing, no interictal epileptiform abnormalities, no electrographic seizures and no paroxysmal behavioral events were recorded during the period of monitoring.      Epilepsy therapeutics:   The patient was started on levetiracetam in [place], which he took for several years, without side " "effects.  He has not been on other antiseizure medications.  After moving to [place], levetiracetam was restarted in 2022.     PAST MEDICAL HISTORY:    1.  Chronic epilepsy with generalized tonic-clonic seizures.  2.  History of systemic hypertension.     FAMILY HISTORY:  There is no family history of seizures or epilepsy, or of other neurological conditions.       PERSONAL AND SOCIAL HISTORY:  The patient grew up in [place] [facility]  He attended school for \"a few years\".  He moved [place] the [place] in [place], then moved from [place] to [place].  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  He lives with his wife and their 4 children, also with his parents in the home.  He denied using tobacco, illicit recreational drugs or ethanol, but he uses substances that increase his alertness including caffeine-containing beverages, energy drinks and betel nut juice.     REVIEW OF SYSTEMS:  The patient denied history of memory disturbance, weakness, tremors or other abnormal involuntary movements, numbness or tingling, incoordination, falling or difficulty in walking, urinary or fecal incontinence, headache and other pain, except as described above.  The patient denied any history of severe depression or suicidal ideation, or severe anxiety.  He denied rashes and easy bruising.  The remainder of a 14-system symptom review was negative.     MEDICATIONS:  Levetiracetam 750 mg b.i.d.     PHYSICAL EXAMINATION:    On examination the patient was an alert man in no apparent distress.  Vital signs were as per the electronic medical record  Skull was normocephalic and atraumatic.  Neck was supple.       On neurological examination, the patient appeared alert and was fully oriented to person, place, time, and reason for visit.  Speech was reported to be normal in the [place] language.  Cranial nerves III through XII were grossly normal.  Muscle masses, tones and strengths were normal throughout.  There was " no pronator drift.  No spontaneous tremors, myoclonus, or other abnormal movements were observed.  Sensations of light touch were reportedly normal throughout.  The finger-nose-finger maneuvers were performed normally bilaterally.  Romberg maneuver was negative.  Regular, heel, toe, tandem and reverse tandem walking were normal.  Deep tendon reflexes were normal and symmetric throughout.  Toes were downgoing bilaterally.       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  The etiology of this is unclear from his history, but a brain MRI showed right frontotemporal encephalomalacic sites consistent with infarctions or other remote injuries.  An outpatient VEEG was normal.     He has had no reported seizures, since 2022.  He denied missed medication doses and denied adverse effects.      We spent some time discussing the mot recent EEG results, and reasons that seizures might recur if he stopped using levetiracetam,.       We discussed [place] driving laws.  He appeared to clearly understand that he is prohibited from operating a motor vehicle within 3 months following any seizure or other episode with sudden unconsciousness or inability to sit up, and that he is required to report any future such seizure to the [facility] within 30 days after the event.       PLAN:    1.  Continue levetiracetam at the current dose.  2.  Return visit in approximately 6 months.     I spent 34 minutes in this patient care, with 23 minutes in direct patient contact, and 11 minutes in chart review and document preparation.         Brandon Sultana M.D.   Professor of Neurology       Again, thank you for allowing me to participate in the care of your patient.      Sincerely,    Brandon Sultana MD